# Patient Record
Sex: FEMALE | Race: WHITE
[De-identification: names, ages, dates, MRNs, and addresses within clinical notes are randomized per-mention and may not be internally consistent; named-entity substitution may affect disease eponyms.]

---

## 2017-03-11 ENCOUNTER — HOSPITAL ENCOUNTER (INPATIENT)
Dept: HOSPITAL 68 - ERH | Age: 27
LOS: 5 days | Discharge: HOME HEALTH | End: 2017-03-16
Attending: INTERNAL MEDICINE | Admitting: INTERNAL MEDICINE
Payer: COMMERCIAL

## 2017-03-11 VITALS — BODY MASS INDEX: 23.9 KG/M2 | WEIGHT: 140 LBS | HEIGHT: 64 IN

## 2017-03-11 DIAGNOSIS — F39: ICD-10-CM

## 2017-03-11 DIAGNOSIS — F17.200: ICD-10-CM
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
## 2017-03-11 DIAGNOSIS — F10.129: Primary | ICD-10-CM

## 2017-03-11 DIAGNOSIS — F32.9: ICD-10-CM

## 2017-03-11 DIAGNOSIS — R74.0: ICD-10-CM

## 2017-03-11 DIAGNOSIS — K21.9: ICD-10-CM

## 2017-03-11 LAB
ABSOLUTE GRANULOCYTE CT: 3.6 /CUMM (ref 1.4–6.5)
BASOPHILS # BLD: 0.1 /CUMM (ref 0–0.2)
BASOPHILS NFR BLD: 1 % (ref 0–2)
EOSINOPHIL # BLD: 0.1 /CUMM (ref 0–0.7)
EOSINOPHIL NFR BLD: 1.3 % (ref 0–5)
ERYTHROCYTE [DISTWIDTH] IN BLOOD BY AUTOMATED COUNT: 15.2 % (ref 11.5–14.5)
GRANULOCYTES NFR BLD: 50.5 % (ref 42.2–75.2)
HCT VFR BLD CALC: 45.4 % (ref 37–47)
LYMPHOCYTES # BLD: 2.8 /CUMM (ref 1.2–3.4)
MCH RBC QN AUTO: 30.7 PG (ref 27–31)
MCHC RBC AUTO-ENTMCNC: 33.6 G/DL (ref 33–37)
MCV RBC AUTO: 91.5 FL (ref 81–99)
MONOCYTES # BLD: 0.5 /CUMM (ref 0.1–0.6)
PLATELET # BLD: 310 /CUMM (ref 130–400)
PMV BLD AUTO: 6.3 FL (ref 7.4–10.4)
RED BLOOD CELL CT: 4.96 /CUMM (ref 4.2–5.4)
WBC # BLD AUTO: 7 /CUMM (ref 4.8–10.8)

## 2017-03-11 PROCEDURE — G0480 DRUG TEST DEF 1-7 CLASSES: HCPCS

## 2017-03-11 PROCEDURE — 2NASP: CPT

## 2017-03-12 VITALS — SYSTOLIC BLOOD PRESSURE: 135 MMHG | DIASTOLIC BLOOD PRESSURE: 86 MMHG

## 2017-03-12 VITALS — DIASTOLIC BLOOD PRESSURE: 67 MMHG | SYSTOLIC BLOOD PRESSURE: 137 MMHG

## 2017-03-12 VITALS — SYSTOLIC BLOOD PRESSURE: 108 MMHG | DIASTOLIC BLOOD PRESSURE: 63 MMHG

## 2017-03-12 VITALS — DIASTOLIC BLOOD PRESSURE: 65 MMHG | SYSTOLIC BLOOD PRESSURE: 120 MMHG

## 2017-03-12 VITALS — DIASTOLIC BLOOD PRESSURE: 60 MMHG | SYSTOLIC BLOOD PRESSURE: 114 MMHG

## 2017-03-12 VITALS — DIASTOLIC BLOOD PRESSURE: 65 MMHG | SYSTOLIC BLOOD PRESSURE: 132 MMHG

## 2017-03-12 PROCEDURE — HZ2ZZZZ DETOXIFICATION SERVICES FOR SUBSTANCE ABUSE TREATMENT: ICD-10-PCS | Performed by: INTERNAL MEDICINE

## 2017-03-12 NOTE — ED PSY CRISIS COLLATERAL NOTE
Collateral Note
 
Collateral Note
Family/Inform/Rosalind Contacts:
telephone call with pt. father Basilio Nesbitt.  He stated that family will take 
"any advice the ED would give" as his daughter has had a significant drinking 
problem for "8-9 years".  He reported that pt. had been in rehab in [place] for the 
last two years and that she was "kicked out of rehab for drinking".  he reported
she has "very sneaky behaviors" around drinking and has possibly tricked the 
parents and the rehab that she was sober when she was in fact not.  As for 
psychiatric issues, father reported that pt. "seemed depressed" and showed this 
by having no energy/motivation and being emotional and crying easily.  He stated
that she has never attempted suicide.  He reported that she can be combative 
when drinking.

## 2017-03-12 NOTE — HISTORY & PHYSICAL
DAVID PATEL,MITZY 03/12/17 2023:
General Information and HPI
MD Statement:
I have seen and personally examined DUNCAN GONSALEZ and documented this H&P.
 
The patient is a 26 year old F who presented with a patient stated chief 
complaint of requesting Alcohol detox.
 
Source of Information: patient, ED documents
History of Present Illness:
This is a 26-year-old lady with a questionable history of depression, an 
extensive social history of alcohol abuse since age of 10, including an episode 
of alcohol intoxication seizure, presents to [facility] ED for alcohol detox.
 
Patient presented yesterday around 9 PM (03/11) with complaints of tremors, some
slurred speech, confusion, nausea, and vomiting.  She reports her last drink was
about 2 hours prior to presentation.  Patient endorses a daily consumption of 2 
pints of vodka for about one year.  When seen at the ED yesterday, patient was 
reported to be anxious and exhibited tremors. The ED records indicated that the 
main reason patient presented to [facility] ED was because of her parents were fed 
up, and were seeking help with detox.  Patient reports that in 2014 she was 
admitted to a rehabilitation facility for detox in [place] and remained 9 months
abstinent before relapsing.  However, psych crisis documentation states that 
patient was noncompliant during rehabilitation and continued to drink.
 
Patient denies any SI/HI, use of any other illicit drug, fever, chills, chest 
pain, palpitation, shortness of breath, focal neurological deficit, seizure-like
activities, hallucination or nightmares.
 
Allergies/Medications
Allergies:
Coded Allergies:
NO KNOWN ALLERGIES (05/21/14)
 
Home Med list
No Known Home Medications
 
 
Past History
 
Travel History
Traveled to [place] past 21 day No
 
Medical History
Neurological: NONE
EENT: NONE
Cardiovascular: NONE
Respiratory: NONE
Gastrointestinal: NONE
Hepatic: NONE
Renal: NONE
Musculoskeletal: NONE
Psychiatric: NONE
Endocrine: NONE
 
Surgical History
Surgical History: none
 
Past Family/Social History
 
Psychosocial History
ETOH Use: alcoholic
Illicit Drug Use: UTD
 
Review of Systems
 
Review of Systems
Constitutional:
Reports: see HPI. 
 
Exam & Diagnostic Data
Last 24 Hrs of Vital Signs/I&O
 Vital Signs
 
 
Date Time Temp Pulse Resp B/P Pulse O2 O2 Flow FiO2
 
     Ox Delivery Rate 
 
03/12 2246 95.8 73 16 108/63    
 
03/12 2246 95.8 73 16 108/63 98 Room Air  
 
03/12 1948 96.5 91 16 135/86    
 
03/12 1948 96.5 91 16 135/86 98 Room Air  
 
03/12 1647 98.6 79 16 114/60    
 
03/12 1647 98.6 79 16 114/60 99 Room Air  
 
03/12 1407 96.8 90 18 132/65    
 
03/12 1356 96.8 90 18 132/65 98 Room Air  
 
03/12 1215 96.6 92 16 137/67    
 
03/12 1214 96.6 92 16 137/67 98 Room Air  
 
03/12 1047 98.1 92 20 124/70 97 Room Air  
 
03/12 0827 98.0 96 18 120/65    
 
03/12 0804 98.0 96 18 120/65 96 Room Air  
 
03/12 0642 98.2 93 18 121/62 97 Room Air  
 
03/12 0432 96.1 93 18 123/63 97 Room Air  
 
 
 Intake & Output
 
 
 03/12 1600 03/12 0800 03/12 0000
 
Intake Total   
 
Output Total   
 
Balance   
 
    
 
Patient   65.771 kg
 
Weight   
 
 
 
 
Physical Exam
General Appearance Alert, Oriented X3, Cooperative
Skin No Significant Lesion
HEENT oral dry mucosa
Neck Supple, No JVD, No thryomegaly, +2 Carotid Pulse wo Bruit
Lymphatic Cervical nl
Cardiovascular Regular Rate, Normal S1, Normal S2, No Murmurs, Gallops, Rubs
Lungs Clear to Auscultation, Normal Air Movement
Abdomen Normal Bowel Sounds, Soft, mild tenderness on left abdominal side. no 
guarding or rebound tenderness
Neurological Normal Speech, Sensation Intact
Extremities No Cyanosis, No Edema, Normal Pulses, No Tenderness/Swelling
Vascular Normal Pulses, Pulses Symmetrical
 
Assessment/Plan
Assessment:
This is a 26-year-old lady with a significant social history of alcohol abuse 
since 10 years, and I reported alcohol intoxication related seizure, presents to
[facility] ED seeking alcohol detoxification.
 
Patient was noted to be slightly tachycardic on presentation  ().U tox was
remarkable for alcohol level of 406.Patient's CIWA score has been ranging 
between 2-8 with tremors being prominent. 
 
Assessment and plan
#Alcohol intoxication
* Admit patient to general medicine 
* Lorazepam 2 mg every 6 hours
* Lorazepam when necessary per CIWA protocol
* Monitor for any withdrawal seizures
* Zofran 4 mg every 4-6 hours for nausea and vomiting
* Normal saline hydration 150 mL per hour
* Banana bag
* Will obtain psychosocial consult tomorrow morning
 
#Tobacco abuse
* Nicotine 14 mg done
* Smoking cessation counseling done
 
#History of depression
Patient endorses depression with a possible remote diagnosis however no 
treatment.
Will obtain psych consult for evaluation and management of depressed.
 
 
As Ranked By This Provider
Problem List:
 1. Alcohol intoxication
 
 2. EtOH dependence
   Qualifiers
 Substance use status: in withdrawal Complication of substance-induced condition
: uncomplicated Qualified Code: F10.230 - Alcohol dependence with withdrawal, 
uncomplicated
 
 
Core Measures/Miscellaneous
 
Acute Coronary Syndrome
ACS Diagnosis: No
 
Cerebrovascular Accident
CVA/TIA Diagnosis: No
 
Congestive Heart Failure
CHF Diagnosis: No
 
Venous Thromboembolism
VTE Risk Factors: Age > 40
[facility] prophylaxis d/t: No contraindications
No VTE Pharm Prophylaxis d/t: No contraindications
VTE Diagnosis: No
VTE Type: NONE
VTE Confirmed by (Test): NONE
 
Severe Sepsis
Severe Sepsis Present: No
 
Septic Shock
Septic Shock Present: No
 
Miscellaneous Documentation
Attending Case Discussed With:
KRISTA PATTON MD
 
Primary Care Physician:
PATIENT HAS NO PRIMARY CARE DR
 
Patient sees these Specialists
none
Level of Patient Care: General Medicine
 
 
ORTIZ HUMPHREYS 03/12/17 2142:
Resident Review Statement
Resident Statement: examined this patient, discussed with intern, agreed with 
intern
Other Findings:
Patient is a 26-year-old woman with no significant past medical history except 
for for alcohol abuse with alcohol related withdrawal seizures, current every 
day smoker, history of depression and anxiety(never been treated for that) 
percent to the ED for evaluation of alcohol abuse in requesting detox.
Patient has been drinking since the age of 10, drinks 2 pints of vodka on a 
daily basis.  She was in the rehabilitation in [place] in 2014, but was 
discharged from the program as she started drinking again. She reported one 
episode of alcohol related seizure episode.
Last drink was around 7 PM before coming to the hospital.  In the ED patient was
confused, had bilateral tremors remained nauseous and vomited couple of times.
She was treated with IV Ativan and Zofran as needed for nausea in the ED.  She 
was coarse in the last 24 hours 7, 6, 9, 11 mostly scored high on the tremors.
 
Patient denied any shortness of breath, reported some intermittent chest 
discomfort without any palpitations.  Still nauseous with some left upper 
quadrant discomfort night any diarrhea or constipation.  Denied any recent 
infections.
She continues to smoke and denies any illicit drug use.
 
Vitals on admission temperature 98.8, pulse 100, respiratory rate 20, blood 
pressure 139/44 on room air
 
On examination
 Appearance: Alert and oriented 3 ,not in acute distress
Skin: Grossly normal.
HEENT: PEERLA
Neck: Supple, No JVD
Cardiovascular: Regular Rate, Normal S1, Normal S2, No Murmurs
Lungs: Lungs clear to auscultation bilaterally
Abdomen: Slight tenderness in the left upper quadrant on exam without any 
guarding.
Neurological: Neuro exam intact grossly
Extremities: No Clubbing, No Cyanosis, No Edema.  Bilateral hand tremors
Vascular: Normal Pulses.
 
Pertinent labs on admission normal WBC count H&H stable and normal platelet 
count, normal BEP, transaminitis: , , with elevated anion gap 21
Pending lipase, Mg and phosphorous
Urine toxicology positive for alcohol level around 406.
 
Assessment and plan:
 
1.  Alcohol intoxication/abuse with withdrawal,  requesting detox:
* We will admit the patient GenMed floor
* Patient appearsdehydrated, we'll start the patient on normal saline at the 
rate of 150 mL per hour.
* Continue with banana bag overnight start the patient on by mouth thiamine 
folate and multivitamins in the morning.
* Start the patient on scheduled Ativan 2 mg every 6 hours
* Continue Ativan as per [facility] protocol.
* Zofran as needed for nausea.
* Fall and seizure precautions
* Social consult in the morning
* Psych consult in the morning.
* Watch for any withdrawal seizures.
 
2.Transaminitis:likely to alcohol abuse :
* Repeat LFTs in am .
 
3. Intermitent heart burn (Alcohol related  GERD /gasstritis :
* Mg normal 
* Start PPI in am 
 
4.  History of  questional depression:
* Will obtain psych consult in the morning for further evaluation of anxiety 
depression.
* Patient would benefit from treatment of depression.
 
5.  Current every day smoker
* Smoking cessation counseling done
* We will start the patient on nicotine patches.
 
Mild to moderate pain controlled with oxycodone and small dose of morphine.
DVT prophylaxis with Lovenox
Patient is full code
 
 
 
KRISTA PATTON 03/13/17 0205:
Attending MD Review Statement
 
Attending Statement
Attending MD Statement: examined this patient, discuss w/resident/PA/NP, agreed 
w/resident/PA/NP, reviewed EMR data (avail), reviewed images, amended to note
Attending Assessment/Plan:
 
CC: alcohol Detox 
PMHx: alcoholism, alcohol related Sz
 
Patient came to ER for EtOH detox. She has been drinking 2 pints of alcohol for 
the last year, last drink was yesterday. She moved from [place] 2 months back 
and has been living with parents. Her parents suggested the detox and brought 
her to ER. In the past patient underwent 42 days of inpatient rehab and 9 month 
of outpatient rehab till Aug 2015, and relapsed again. She complains of 
intermittent nausea, vomiting, and epigasrtic abdominal pain, and chest pain, 
feeling better now. Denies illicit drug use. She denies SI or plan, homicidal 
ideation, visual or auditory hallucinations, LOC, falls, trauma, Sz, fevers, 
chills, SOB, or focal neurologic s/s 
 
Vitals: afebrile, Hr in 70s to 90s blood pressure in acceptable range. 
Saturating well on room air. On examination a O 3, no anxiety, no tremors, 
mucosa dry, neck supple, no lymphadenopathy, no JVD, skin rash on neck (
nonpruritic, absent on abdomen and back) CVS: S1-S2, RRR. RS: Clear to 
auscultate bilaterally. Abdomen: Soft, mild epigastric tenderness, no guarding 
or rigidity, ND, bowel sounds present. No focal neurological deficit. 
 
Labs: Done on 03/11/2017 shows anion gap of 21, bicarbonate 26, creatinine 0.6, 
BUN 10, , , total protein 8.5, beta-hCG negative, alcohol level 
406, U tox negative
 
A and P 
 
#1 alcohol withdrawal 
Heavy alcohol use since the age of 14, failed rehabilitation in the past, wants 
to go for inpatient rehabilitation again and request detox. CIWA score in ER was
ranging from 11-6
- repeat CMP today, check magnesium, phosphorus, replace accordingly
- Scheduled Ativan 2 mg by mouth every 6 hourly, taper from tomorrow, as needed 
IV Ativan according to CIWA score.
- By mouth thiamine 100 mg daily, by mouth folic acid, Alta 10 patch
-Psychiatry consult 
 
#2 transaminitis: Secondary to alcoholism, repeated today and as required 
tomorrow, depending on today's labs. Patient has multiple tattoos done in 
different locations, check hepatitis B and C panel 
 
#3 epigastric pain: Probably secondary to alcohol related gastritis: Check 
lipase, PPI by mouth 
 
#4 encourage oral diet, IV hydration with NS and banana bag for her on an gap 
metabolic acidosis and metabolic alkalosis related to volume contraction, when 
necessary Zofran for nausea and vomiting

## 2017-03-13 VITALS — SYSTOLIC BLOOD PRESSURE: 130 MMHG | DIASTOLIC BLOOD PRESSURE: 80 MMHG

## 2017-03-13 VITALS — DIASTOLIC BLOOD PRESSURE: 80 MMHG | SYSTOLIC BLOOD PRESSURE: 130 MMHG

## 2017-03-13 VITALS — SYSTOLIC BLOOD PRESSURE: 110 MMHG | DIASTOLIC BLOOD PRESSURE: 70 MMHG

## 2017-03-13 VITALS — SYSTOLIC BLOOD PRESSURE: 122 MMHG | DIASTOLIC BLOOD PRESSURE: 70 MMHG

## 2017-03-13 LAB
ABSOLUTE GRANULOCYTE CT: 2.3 /CUMM (ref 1.4–6.5)
BASOPHILS # BLD: 0 /CUMM (ref 0–0.2)
BASOPHILS NFR BLD: 1.1 % (ref 0–2)
EOSINOPHIL # BLD: 0.2 /CUMM (ref 0–0.7)
EOSINOPHIL NFR BLD: 3.5 % (ref 0–5)
ERYTHROCYTE [DISTWIDTH] IN BLOOD BY AUTOMATED COUNT: 14.6 % (ref 11.5–14.5)
GRANULOCYTES NFR BLD: 50.7 % (ref 42.2–75.2)
HCT VFR BLD CALC: 42.2 % (ref 37–47)
LYMPHOCYTES # BLD: 1.5 /CUMM (ref 1.2–3.4)
MCH RBC QN AUTO: 30.9 PG (ref 27–31)
MCHC RBC AUTO-ENTMCNC: 33.6 G/DL (ref 33–37)
MCV RBC AUTO: 92 FL (ref 81–99)
MONOCYTES # BLD: 0.5 /CUMM (ref 0.1–0.6)
PLATELET # BLD: 202 /CUMM (ref 130–400)
PMV BLD AUTO: 6.9 FL (ref 7.4–10.4)
RED BLOOD CELL CT: 4.59 /CUMM (ref 4.2–5.4)
WBC # BLD AUTO: 4.6 /CUMM (ref 4.8–10.8)

## 2017-03-13 NOTE — PN- HOUSESTAFF
REY PATEL,[facility] 17 0732:
Subjective
Follow-up For:
Alcohol Detoxification
Subjective:
Miss Rankin was seen and examined this morning.  She is resting comfortably in 
bed.  Patient states that she feels very tired.  She states that she was unable 
to sleep till late last night.  Patient denies any fever, chills, nausea, 
vomiting.  She denies any auditory and/or visual hallucinations she denies any 
anxiety or tremors. She is tolerating a PO diet well. 
 
Review of Systems
Constitutional:
Reports: see HPI. 
 
Objective
Last 24 Hrs of Vital Signs/I&O
 Vital Signs
 
 
Date Time Temp Pulse Resp B/P Pulse O2 O2 Flow FiO2
 
     Ox Delivery Rate 
 
 0704 98.0 88 16 130/80 98 Room Air  
 
 0010 98.0 88 16 130/80 98 Room Air  
 
 2246 95.8 73 16 108/63    
 
 2246 95.8 73 16 108/63 98 Room Air  
 
 1948 96.5 91 16 135/86    
 
 1948 96.5 91 16 135/86 98 Room Air  
 
 1647 98.6 79 16 114/60    
 
 1647 98.6 79 16 114/60 99 Room Air  
 
 1407 96.8 90 18 132/65    
 
 1356 96.8 90 18 132/65 98 Room Air  
 
 
 Intake & Output
 
 
  1600  0800  0000
 
Intake Total  1500 240
 
Output Total   
 
Balance  1500 240
 
    
 
Intake, IV  1200 
 
Intake, Oral  300 240
 
Patient   63.503 kg
 
Weight   
 
 
 
 
Physical Exam
General Appearance: Alert, Oriented X3, Cooperative
Cardiovascular: Regular Rate, Normal S1, Normal S2
Lungs: Clear to Auscultation
Abdomen: Normal Bowel Sounds, Soft, No Tenderness
Neurological: Normal Speech
Extremities: No Cyanosis, No Edema, Normal Pulses
Vascular: Normal Pulses
Current Medications:
 Current Medications
 
 
  Sig/Rishabh Start time  Last
 
Medication Dose Route Stop Time Status Admin
 
Acetaminophen 650 MG Q6P PRN  2215 AC 
 
  PO   
 
Cyanocobalamin/ 1 BAG ONCE ONE  2230 DC 
 
Thiamine/Pyridoxine  IV  0629  
 
Dextrose/Water 1,000 ML    
 
Enoxaparin Sodium 40 MG DAILY  1000 AC 
 
  SC   1126
 
Folic Acid 1 MG DAILY  1000 AC 
 
  PO   1125
 
Influenza Virus  0.5 ML 1000  1000 DC 
 
Vaccine  IM  1001  
 
Lorazepam 1.5 MG Q6  1200 AC 
 
  PO   1126
 
Lorazepam 0 .STK-MED ONE 2202 DC 
 
  PO   
 
Lorazepam 2 MG Q6  2145 DC 
 
  PO   0525
 
Lorazepam 1 MG Q1P PRN  214 AC 
 
  IV   
 
Lorazepam 0 .STK-MED ONE  1448 DC 
 
  PO   
 
Lorazepam 2 MG Q2P PRN  2315 AC 
 
  PO   
 
Lorazepam 1 MG Q2P PRN  2315 AC 
 
  PO   1449
 
Morphine Sulfate 1 MG Q8P PRN  2215 DC 
 
  IV   
 
Multivitamins 1 TAB DAILY  1000 AC 
 
  PO   1125
 
Nicotine 0 .STK-MED ONE 2202 DC 
 
  TOP   
 
Nicotine 14 MG DAILY  214 AC 
 
  TOP   2237
 
Omeprazole 20 MG DAILY AC  0700 CAN 
 
  PO   
 
Omeprazole 20 MG DAILY AC  0700 AC 
 
  PO   0528
 
Ondansetron HCl 4 MG .STK-MED ONE  0013 DC 
 
  PO  0014  
 
Ondansetron HCl 4 MG Q8P PRN  2145 AC 
 
  PO   0018
 
Oxycodone HCl 5 MG Q6P PRN  221 AC 
 
  PO   
 
Sodium Chloride 1,000 ML Q6H  2145 AC 
 
  IV   0659
 
Thiamine HCl 100 MG DAILY  1000 AC 
 
  PO   1125
 
Thiamine HCl 0 .STK-MED ONE  215 DC 
 
  .ROUTE   
 
 
 
 
Last 24 Hrs of Lab/Deven Results
Last 24 Hrs of Labs/Mics:
 Laboratory Tests
 
17 0645:
Anion Gap 12, Estimated GFR > 60, BUN/Creatinine Ratio 18.3, CBC w Diff NO MAN 
DIFF REQ, RBC 4.59, MCV 92.0, MCH 30.9, RDW 14.6  H, MPV 6.9  L, Gran % 50.7, 
Lymphocytes % 33.5, Monocytes % 11.2  H, Eosinophils % 3.5, Basophils % 1.1, 
Absolute Granulocytes 2.3, Absolute Lymphocytes 1.5, Absolute Monocytes 0.5, 
Absolute Eosinophils 0.2, Absolute Basophils 0, PUBS MCHC 33.6, Hepatitis C 
Antibody NONREACTIVE
 
17 0030:
Anion Gap 16, Estimated GFR > 60, BUN/Creatinine Ratio 20.0
 
 
Assessment/Plan
Assessment:
Ms Rankin is a 26 year old female with alcohol abuse history who has been 
admitted for acute alcoholic withdrawal. She has has an extensive alcohol use 
history for over 15 years, and has recently been consuming at least 2 pints of 
vodka per day. 
 
#Alcohol Abuse/intoxication 
Patient was initially started on maintainance fluids. 
Continue vitamins: thiamine, folate, MVI
The patient was started on scheduled Ativan 2 mg every 6 hours-->1.5 mg Q6. If 
stable, can consider reducing in the next 12 hours. Do not decrease ativan by 
more than 20% over a 24 hour period. 
Continue Ativan as per CIWA protocol: current ciwa: 8,1,0,0,2,3,5
Zofran as needed for nausea.
Fall and seizure precautions
Tylenol for symptoms of headache. 
 
#Transaminitis
LFTs at the time of admission revealed:
Repeat LFTs in am .
 
#Mood Disturbance History
Formal psychiartric consultation obtained. 
The patient might benefit from IOP once stable from a medical perspective. 
Gabapentin had been suggested to aid in the symptoms of anxiety. 
The patient did appear drowsy, but if warranted, consider Melatonin PRN. 
 
#History of Smoking
Currently smokes approximately 10 cigarettes per day. 
Nicotine Patch 14 mg. 
 
#DVT Prophylaxis:
with Lovenox
 
#Code:
Full code
Problem List:
 1. EtOH dependence
 
 2. Depression
 
 3. Alcohol intoxication
 
 4. Alcohol abuse
 
Pain Ratin
Pain Location:
No Pain reported
Pain Goal: Remain pain free
Pain Plan:
Tylenol PRN
Tomorrow's Labs & Rationales:
BEP: monitor electrolytes in the setting of acute alcohol withdrawal and 
detoxification. 
 
 
BERTHA PATEL,ARELY 17 1217:
Attending MD Review Statement
 
Attending Statement
Attending MD Statement: examined this patient, discuss w/resident/PA/NP, agreed 
w/resident/PA/NP, reviewed EMR data (avail), discussed with nursing, discussed 
with case mgmt, reviewed images, amended to note
Attending Assessment/Plan:
Patient seen and examined, feeling overall better today.  She is a 26-year-old 
who is admitted with acute alcohol intoxication needing detox.
 
Vital Signs
 
 
Date Time Temp Pulse Resp B/P Pulse O2 O2 Flow FiO2
 
     Ox Delivery Rate 
 
 0704 98.0 88 16 130/80 98 Room Air  
 
 0010 98.0 88 16 130/80 98 Room Air  
 
 2246 95.8 73 16 108/63    
 
/ 2246 95.8 73 16 108/63 98 Room Air  
 
 1948 96.5 91 16 135/86    
 
/ 1948 96.5 91 16 135/86 98 Room Air  
 
 1647 98.6 79 16 114/60    
 
/ 1647 98.6 79 16 114/60 99 Room Air  
 
 1407 96.8 90 18 132/65    
 
/ 1356 96.8 90 18 132/65 98 Room Air  
 
 
on exam; 
aox3, nad. 
cv; s1,s2, rrr
resp; clear
abd; soft, nt, bs+
ext; no edema 
 
 Laboratory Tests
 
 
 
 
 0645 0030
 
Chemistry  
 
  Sodium (137 - 145 mmol/L) 133  L 136  L
 
  Potassium (3.5 - 5.1 mmol/L) 3.7 3.7
 
  Chloride (98 - 107 mmol/L) 94  L 90  L
 
  Carbon Dioxide (22 - 30 mmol/L) 27 30
 
  Anion Gap (5 - 16) 12 16
 
  BUN (7 - 17 mg/dL) 11 12
 
  Creatinine (0.5 - 1.0 mg/dL) 0.6 0.6
 
  Estimated GFR (>60 ml/min) > 60 > 60
 
  BUN/Creatinine Ratio (7 - 25 %) 18.3 20.0
 
Hematology  
 
  CBC w Diff NO MAN DIFF REQ 
 
  WBC (4.8 - 10.8 /CUMM) 4.6  L 
 
  RBC (4.20 - 5.40 /CUMM) 4.59 
 
  Hgb (12.0 - 16.0 G/DL) 14.2 
 
  Hct (37 - 47 %) 42.2 
 
  MCV (81.0 - 99.0 FL) 92.0 
 
  MCH (27.0 - 31.0 PG) 30.9 
 
  RDW (11.5 - 14.5 %) 14.6  H 
 
  Plt Count (130 - 400 /CUMM) 202 
 
  MPV (7.4 - 10.4 FL) 6.9  L 
 
  Gran % (42.2 - 75.2 %) 50.7 
 
  Lymphocytes % (20.5 - 51.1 %) 33.5 
 
  Monocytes % (1.7 - 9.3 %) 11.2  H 
 
  Eosinophils % (0 - 5 %) 3.5 
 
  Basophils % (0.0 - 2.0 %) 1.1 
 
  Absolute Granulocytes (1.4 - 6.5 /CUMM) 2.3 
 
  Absolute Lymphocytes (1.2 - 3.4 /CUMM) 1.5 
 
  Absolute Monocytes (0.10 - 0.60 /CUMM) 0.5 
 
  Absolute Eosinophils (0.0 - 0.7 /CUMM) 0.2 
 
  Absolute Basophils (0.0 - 0.2 /CUMM) 0 
 
  PUBS MCHC (33.0 - 37.0 G/DL) 33.6 
 
Serology  
 
  Hepatitis C Antibody (NONREACTIVE) NONREACTIVE 
 
 
A/P; 26-year-old female with past medical history significant for alcohol abuse,
depression who is admitted with acute alcohol intoxication.
 
Continue scheduled Ativan with a taper.  Will decrease it to 1.5 g every 6 
hours.  Continue when necessary Ativan per CIWA protocol.  Continue multivitamin
, folate and thiamine.
Psych consult is pending.
DVT px: Lovenox.

## 2017-03-13 NOTE — TRANSFER OF CARE SUMMARY
Hospital Course
 
Course
Hospital Course:
Ms Rankin is a 26 year old female with alcohol abuse history who has been 
admitted for acute alcoholic withdrawal. She has has an extensive alcohol use 
history for over 15 years, and has recently been consuming at least 2 pints of 
vodka per day. 
 
#Alcohol Abuse/intoxication 
Patient was initially started on maintainance fluids. 
Continue vitamins: thiamine, folate, MVI
The patient was started on scheduled Ativan 2 mg every 6 hours-->1.5 mg Q6. If 
stable, can consider reducing in the next 12 hours. Do not decrease ativan by 
more than 20% over a 24 hour period. 
Continue Ativan as per CIWA protocol: current ciwa: 8,1,0,0,2,3,5
Zofran as needed for nausea.
Fall and seizure precautions
Tylenol for symptoms of headache. 
 
#Transaminitis
LFTs at the time of admission revealed:
Repeat LFTs in am .
 
#Mood Disturbance History
Formal psychiartric consultation obtained. 
The patient might benefit from IOP once stable from a medical perspective. 
Gabapentin had been suggested to aid in the symptoms of anxiety. 
The patient did appear drowsy, but if warranted, consider Melatonin PRN. 
 
#History of Smoking
Currently smokes approximately 10 cigarettes per day. 
Nicotine Patch 14 mg. 
 
#DVT Prophylaxis:
with Lovenox
 
#Code:
Full code
Pertinent Lab Results:
Serum Alcohol Level 406
AST: 164
ALT: 137
Assessment/Plan:
Ms Rankin is a 26 year old female with alcohol abuse history who has been 
admitted for acute alcoholic withdrawal. She has has an extensive alcohol use 
history for over 15 years, and has recently been consuming at least 2 pints of 
vodka per day. 
Continue the patient on CIWA, she will likely need IOP once she is medically 
cleared.

## 2017-03-13 NOTE — CONS- PSYCHIATRY
**See Addendum**
TEENA MCDONALD 03/13/17 1405:
Psychiatric Consult
Date of Consult: 03/13/17
Reason for Consult:
depression, EtOH detox
History of Present Illness:
26F with history of depression, alcohol abuse disorder is on HD#3 for EtOH 
detoxification. Pt has used alcohol since age 10, one unsuccessful attempt at 
rehab in 2014, has most recently been drinking "at least 2 pints of vodka per 
day". Last EtOH was at 19:00 on 3/11/17. Pt is tearful when asked if she is 
experiencing any sadness. She states her depression is at baseline (8/10) and 
her anxiety is at baseline (4/10). Physically she complains of some paroxysmal 
sweats, headache, and some heartburn today. She had nausea/vomiting yesterday 
which has resolved today with Zofran. Headache has slightly improved with 
Tylenol. She denies tremor, auditory disturbances, visual disturbances, tactile 
disturbances, agitation/irritability or confusion. CIWA score peaked at 11 
yesterday, and today has been ranging from 0-5. Pt is walking to and from the 
bathroom with ease. She states her withdrawal symptoms are being sufficiently 
controlled with medication.
 
ROS:
General: (+) Sweats.
GI: (+) Heartburn. (-) Nausea or vomiting (resolved since yesterday). (-) 
Abdominal discomfort.
Neuropsych: (+) Headache, anxiety, depression.
(-) Tremor, AH/VH/TH, agitation, irritability, confusion, SI/HI.
 
Social Hx:
- EtOH "at least 2 pints vodka per day." In November 2014 she attended a EtOH 
detox program at [facility] in [place] for 42 days, and 
reports being abstinent from EtOH for the subsequent 9 months while living in a 
[facility] house in [place] until she got bored and began drinking again.
- Denies ilicit drug use.
- Current 0.5ppd smoker.
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
being beligerant."
Allergies:
Coded Allergies:
NO KNOWN ALLERGIES (05/21/14)
 
 
Past History
 
Past Medical History
Neurological: NONE
EENT: NONE
Cardiovascular: NONE
Respiratory: NONE
Gastrointestinal: NONE
Hepatic: NONE
Renal: NONE
Musculoskeletal: NONE
Psychiatric: NONE
Endocrine: NONE
Blood Disorders: NONE
Cancer(s): NONE
 
Past Surgical History
Surgical History: 1
 
Psychosocial History
Strengths/Capabilities:
Able to ask for help, and articulate has some good insight.
Physical Limitations (Interventions):
denies
 
Psychiatric Treatment History
Diagnosis:
depression and alcohol dependence
Risk Factors: SA/MH hospitalized, substance abuse
 
LUIS F CASTILLO 03/13/17 1442:
Psychiatric Consult
Date of Consult: 03/13/17
 
Past History
 
Past Medical History
Psychiatric: alcohol dependence, depression, R/O bipolar R/O bulemia
 
Psychosocial History
Strengths/Capabilities:
Motivated for aftercare
Physical Limitations (Interventions):
None apparent
 
Psychiatric Treatment History
Psych Treatment
   Psychiatric Treatment Yes
   Inpatient Treatment Yes
   Outpatient Treatment Yes
   Location of Treatment [place], [place] rehab
   Reason for Treatment
Depression, alcohol dependence, r/o bipolar, r/o bulemia
   Dates of Treatment 2014
   Response to Treatment
Improved, but relapsed in her [facility] in Nov, 2014
Risk Factors: SA/MH hospitalized, substance abuse
 
Substance Use/Abuse History
Drug Use/Abuse
   Substances Used/Abused Yes
   Substance Used/Abused Alcohol
   First Use 10
   Last Used 3/11/17
   How much used/taken 2 pints daily
 
Substance Abuse Treatment
Substance Abuse Treatment
   Past Substance Abuse [place] Yes
   Inpatient Treatment Yes
   Outpatient Treatment Yes
   Location of Treatment [facility], rehab in [place]
   Reason for Treatment
Alcohol dependence
   Dates of Treatment 2014
   Response to Treatment
Improved until relapse in [facility] in [place]
 
Assessment/Plan
 
Mental Status
Mental Status Exam:
See above.
Lab Results:
 Laboratory Tests
 
 
 03/13 03/13
 
 0645 0030
 
Chemistry  
 
  Sodium (137 - 145 mmol/L) 133  L 136  L
 
  Potassium (3.5 - 5.1 mmol/L) 3.7 3.7
 
  Chloride (98 - 107 mmol/L) 94  L 90  L
 
  Carbon Dioxide (22 - 30 mmol/L) 27 30
 
  Anion Gap (5 - 16) 12 16
 
  BUN (7 - 17 mg/dL) 11 12
 
  Creatinine (0.5 - 1.0 mg/dL) 0.6 0.6
 
  Estimated GFR (>60 ml/min) > 60 > 60
 
  BUN/Creatinine Ratio (7 - 25 %) 18.3 20.0
 
Hematology  
 
  CBC w Diff NO MAN DIFF REQ 
 
  WBC (4.8 - 10.8 /CUMM) 4.6  L 
 
  RBC (4.20 - 5.40 /CUMM) 4.59 
 
  Hgb (12.0 - 16.0 G/DL) 14.2 
 
  Hct (37 - 47 %) 42.2 
 
  MCV (81.0 - 99.0 FL) 92.0 
 
  MCH (27.0 - 31.0 PG) 30.9 
 
  RDW (11.5 - 14.5 %) 14.6  H 
 
  Plt Count (130 - 400 /CUMM) 202 
 
  MPV (7.4 - 10.4 FL) 6.9  L 
 
  Gran % (42.2 - 75.2 %) 50.7 
 
  Lymphocytes % (20.5 - 51.1 %) 33.5 
 
  Monocytes % (1.7 - 9.3 %) 11.2  H 
 
  Eosinophils % (0 - 5 %) 3.5 
 
  Basophils % (0.0 - 2.0 %) 1.1 
 
  Absolute Granulocytes (1.4 - 6.5 /CUMM) 2.3 
 
  Absolute Lymphocytes (1.2 - 3.4 /CUMM) 1.5 
 
  Absolute Monocytes (0.10 - 0.60 /CUMM) 0.5 
 
  Absolute Eosinophils (0.0 - 0.7 /CUMM) 0.2 
 
  Absolute Basophils (0.0 - 0.2 /CUMM) 0 
 
  PUBS MCHC (33.0 - 37.0 G/DL) 33.6 
 
Serology  
 
  Hepatitis C Antibody (NONREACTIVE) NONREACTIVE 
 
 
 
3/11/17: HBSAb positive, HCG negative, /, serum ETOH 406, utox 
negative
Diffential Diagnosis:
Alcohol use d/o, severe, recurrent
Depressive d/o NOS
R/O bipolar d/o
R/o bulemia
Impression:
The patient has been treated in the past at [facility] and [facility] with Prozac, 
Abilify, gabapentin and trazodone in the past.
 
Prozac and Abilify can wait until the patient comes to [facility], as she has not been 
followed by outpatient psychiatry since leaving the [facility] in [place] in 
November/December, 2014. 
 
[facility] 3/13/17 1139: 2-3-5-7-7-5-6-9-3-6-9-11-2
VS 0704: 130/80, 88, 98.0 Oral, 16RR 98% RA
 
As of 1340, Lorazepam 5.5 mg scheduled and 1 mg PRN, for a total of 6.5 mg 
total. Reduce this total by no more than 20% daily (1.5 mg), due to the history 
of seizure.
Provisional Treatment Plan:
1. Continue ETOH Detox protocol.
 
2. Daily folate, thiamine, MVI.
 
3. The patient has agreed to come to [facility] after her alcohol detox is complete.
We will arrange that intake appointment when her discharge date is known.
 
4. Consider starting gabapentin 100 mg PO 3X/day for anxiety, and off-label use.
 
5. If insomnia, consider melatonin 3 mg PO at bedtime.
 
We will continue to follow along with you.
DILAN Luis, Pager [phone]

## 2017-03-13 NOTE — PATIENT DISCHARGE INSTRUCTIONS
Discharge Instructions
 
General Discharge Information
Watch for these problems:
Fever, nausea, vomiting, chills, weakness, increased generalized edema. 
Palpitations. Chest pain. Shortness of breath. 
 
If you have any adverse reactions from any of the medications prescribed please 
inform your primary care physician and you may be required to come back to the 
emergency department.
 
Thank you for allowing us to be part of your care. 
Special Instructions:
Please follow up with your PCP in one week. Please inform your PCP about the 
medication changes we have made and started.
 
You have an appointment at [facility] on Friday morning at 10 AM, please follow
with them on time
 
Follow up with [facility] at [place] within 2 weeks.
 
Diet
Continue normal diet: Yes
 
Activity
Full Activity/No Limits: Yes
 
Acute Coronary Syndrome
 
Inclusion Criteria
At DC or during hospital stay patient has or had the following:
ACS DIAGNOSIS No
 
Discharge Core Measures
Meds if any: Prescribed or Continued at Discharge
Meds if any: NOT Prescribed or Continued at Discharge
 
Congestive Heart Failure
 
Inclusion Criteria
At DC or during hospital stay patient has or had the following:
CHF DIAGNOSIS No
 
Discharge Core Measures
Meds if any: Prescribed or Continued at Discharge
Meds if any: NOT Prescribed or Continued at Discharge
 
Cerebrovascular accident
 
Inclusion Criteria
At DC or during hospital stay patient has or had the following:
CVA/TIA Diagnosis No
 
Discharge Core Measures
Meds if any: Prescribed or Continued at Discharge
Meds if any: NOT Prescribed or Continued at Discharge
 
Venous thromboembolism
 
Inclusion Criteria
VTE Diagnosis No
VTE Type NONE
VTE Confirmed by (Test) NONE
 
Discharge Core Measures
- Per Current guidelines, there needs to be overlap
- treatment for the first 5 days of Warfarin therapy.
- If discharged on Warfarin prior to 5 days of
- overlap therapy, the patient will need to be
- assessed for post discharge needs including
- *Post discharge parental anticoagulation
- *Warfarin and/or parental anticoagulation education
- *Follow up date to check INR post discharge
At least 5 days overlap therapy as Inpatient No
Meds if any: Prescribed or Continued at Discharge
Note: Overlap Therapy is Warfarin and Anticoagulant
Meds if any: NOT Prescribed or Continued at Discharge

## 2017-03-14 VITALS — SYSTOLIC BLOOD PRESSURE: 106 MMHG | DIASTOLIC BLOOD PRESSURE: 80 MMHG

## 2017-03-14 VITALS — SYSTOLIC BLOOD PRESSURE: 130 MMHG | DIASTOLIC BLOOD PRESSURE: 70 MMHG

## 2017-03-14 VITALS — SYSTOLIC BLOOD PRESSURE: 122 MMHG | DIASTOLIC BLOOD PRESSURE: 84 MMHG

## 2017-03-14 VITALS — DIASTOLIC BLOOD PRESSURE: 84 MMHG | SYSTOLIC BLOOD PRESSURE: 122 MMHG

## 2017-03-14 VITALS — DIASTOLIC BLOOD PRESSURE: 60 MMHG | SYSTOLIC BLOOD PRESSURE: 110 MMHG

## 2017-03-14 VITALS — DIASTOLIC BLOOD PRESSURE: 70 MMHG | SYSTOLIC BLOOD PRESSURE: 110 MMHG

## 2017-03-14 NOTE — PN- HOUSESTAFF
[facility]
Subjective
Follow-up For:
Alcohol detox
Subjective:
Afebrile, no acute overnight events reported, patient looks relaxed and 
comfortable.  She is not anxious, agitated, and denies any tremor or 
hallucination. 
 
Review of Systems
Constitutional:
Denies: chills, fever, weakness. 
Cardiovascular:
Reports: no symptoms. 
Respiratory:
Reports: no symptoms. 
Gastrointestinal:
Reports: no symptoms. 
Genitourinary:
Reports: no symptoms. 
 
Objective
Last 24 Hrs of Vital Signs/I&O
 Vital Signs
 
 
Date Time Temp Pulse Resp B/P Pulse O2 O2 Flow FiO2
 
     Ox Delivery Rate 
 
 0801 98.7 60 20 110/60 96 Room Air  
 
 0724 98.7 80 18 106/80 96 Room Air  
 
 0211 98.9 87 20 110/70 96 Room Air  
 
 2331 99.1 89 20 110/70 97   
 
 1355 98.1 82 18 122/70 97 Room Air  
 
 
 Intake & Output
 
 
  1600  0800  0000
 
Intake Total  1300 700
 
Output Total   
 
Balance  1300 700
 
    
 
Intake, IV  1200 600
 
Intake, Oral  100 100
 
 
 
 
Physical Exam
General Appearance: Alert, Oriented X3, Cooperative, No Acute Distress
Skin: No Rashes
HEENT: Atraumatic, PERRLA, EOMI, Mucous Membr. moist/pink
Cardiovascular: Regular Rate, Normal S1, Normal S2, No Murmurs
Lungs: Clear to Auscultation, Normal Air Movement
Abdomen: Soft, No Tenderness
Neurological: Normal Speech
Extremities: No Clubbing, No Cyanosis, No Edema
Current Medications:
 Current Medications
 
 
  Sig/Rishabh Start time  Last
 
Medication Dose Route Stop Time Status Admin
 
Acetaminophen 650 MG Q6P PRN  2215 AC 
 
  PO   
 
Enoxaparin Sodium 40 MG DAILY  1000 AC 
 
  SC   1126
 
Folic Acid 1 MG DAILY  1000 AC 
 
  PO   1125
 
Gabapentin 100 MG Q8P PRN  1915 AC 
 
  PO   
 
Influenza Virus  0.5 ML 1000  1000 DC 
 
Vaccine  IM  1001  
 
Lorazepam 1.5 MG Q6  1200 AC 
 
  PO   0559
 
Lorazepam 2 MG Q6  2145 DC 
 
  PO   0525
 
Lorazepam 1 MG Q1P PRN  2145 AC 
 
  IV   
 
Lorazepam 2 MG Q2P PRN  2315 AC 
 
  PO   
 
Lorazepam 1 MG Q2P PRN  2315 AC 
 
  PO   1449
 
Morphine Sulfate 1 MG Q8P PRN  2215 DC 
 
  IV   
 
Multivitamins 1 TAB DAILY  1000 AC 
 
  PO   1125
 
Nicotine 14 MG DAILY  2145 AC 
 
  TOP   2237
 
Omeprazole 20 MG DAILY AC  0700 AC 
 
  PO   0600
 
Ondansetron HCl 4 MG Q8P PRN  2145 AC 
 
  PO   0018
 
Oxycodone HCl 5 MG Q6P PRN  2215 AC 
 
  PO   
 
Patient Medication  1 ED .STK-MED ONE  1413 DC 
 
Teaching  ED  1414  
 
Sodium Chloride 1,000 ML Q6H  214 AC 
 
  IV   0438
 
Thiamine HCl 100 MG DAILY  1000 AC 
 
  PO   1125
 
 
 
 
Last 24 Hrs of Lab/Deven Results
Last 24 Hrs of Labs/Mics:
 Laboratory Tests
 
17 0700:
Anion Gap 2  L, Estimated GFR > 60, BUN/Creatinine Ratio 18.3
 
 
Assessment/Plan
Assessment:
Ms Rankin is a 26 year old female with alcohol abuse history who has been 
admitted for acute alcoholic withdrawal. She has has an extensive alcohol use 
history for over 15 years, and has recently been consuming at least 2 pints of 
vodka per day. 
 
#Alcohol Abuse/intoxication 
 Pt has history of seizure in summer of  while attempting to detox herself 
from EtOH. this time she is tolerating well, she scores 0 on CIWA score since 
yesterday 14:00
* We will Continue vitamins: thiamine, folate, MVI
* W'll Continue Ativan 1.5 mg Q6 for now and continue Ativan taper as per CIWA 
protocol.
* Zofran as needed for nausea.
* Fall and seizure precautions
* Tylenol for headache. 
 
#Transaminitis
 On admission , she had transaminitis with AST > ALT, this is most 
likely secondary to alcohol.  Hepatitis C and hepatitis B were negative.
* We'll repeat liver function today
 
#Mood Disturbance History
 Patient has history of depression, anxiety, and mild insomnia. psych is on 
board.
* We will Continue gabapentin for anxiety as per psych (off label)
* We will continue Melatonin 3 mg PRN. 
 
#History of Smoking
* Nicotine Patch 14 mg. 
 
Regular diet
DVT Ppx Lovenox
Full code
Problem List:
 1. EtOH dependence
 
 2. Depression
 
Pain Ratin
Pain Location:
na
Pain Goal: Remain pain free
Pain Plan:
see A&P
Tomorrow's Labs & Rationales:
see A&P
 
 
JONEL PATEL,TARAN 17 1011:
Attending MD Review Statement
 
Attending Statement
Attending MD Statement: examined this patient, discuss w/resident/PA/NP, agreed 
w/resident/PA/NP, reviewed EMR data (avail), discussed with nursing, reviewed 
images, amended to note
Attending Assessment/Plan:
Patient seen and examined.  Resting comfortably and not in any acute distress.  
No issues overnight reported by nursing staff.  CIWA score has been improving.  
On examination she is calm.  She is not agitated and tremulous.  She is 
hemodynamically stable.  I agree with continued taper of her benzodiazepine 
regimen.  Psychiatry consultation appreciated.  She should follow-up with the 
IOP program upon discharge.

## 2017-03-15 VITALS — DIASTOLIC BLOOD PRESSURE: 70 MMHG | SYSTOLIC BLOOD PRESSURE: 130 MMHG

## 2017-03-15 VITALS — SYSTOLIC BLOOD PRESSURE: 128 MMHG | DIASTOLIC BLOOD PRESSURE: 80 MMHG

## 2017-03-15 VITALS — DIASTOLIC BLOOD PRESSURE: 70 MMHG | SYSTOLIC BLOOD PRESSURE: 108 MMHG

## 2017-03-15 VITALS — DIASTOLIC BLOOD PRESSURE: 74 MMHG | SYSTOLIC BLOOD PRESSURE: 126 MMHG

## 2017-03-15 NOTE — PN- HOUSESTAFF
[facility] 0825[facility]
Subjective
Follow-up For:
Alcohol detox
Subjective:
Patient is afebrile and stable, no acute overnight events reported, she is 
laying on bed looks relaxed and comfortable.  She denies headache, nausea, 
hallucination, tremor, or feeling anxious.
 
Review of Systems
Constitutional:
Reports: no symptoms. 
 
Objective
Last 24 Hrs of Vital Signs/I&O
 Vital Signs
 
 
Date Time Temp Pulse Resp B/P Pulse O2 O2 Flow FiO2
 
     Ox Delivery Rate 
 
03/15 1407 99.5 97 20 128/80 97 Room Air  
 
03/15 0637 98.7 60 18 126/74 99 Room Air  
 
03/15 0400 98.4 78 18 130/70    
 
03/15 0000 98.4 78 18 130/70    
 
 2132 98.4 78 18 130/70 95 Room Air  
 
 2000 98.1 98 20 122/84    
 
 
 Intake & Output
 
 
 03/15 1600 03/15 0800 03/15 0000
 
Intake Total 1240 1560 800
 
Output Total   
 
Balance 1240 1560 800
 
    
 
Intake,  1200 
 
Intake, Oral 640 360 800
 
 
 
 
Physical Exam
General Appearance: Alert, Oriented X3, Cooperative, No Acute Distress
Skin: No Rashes
HEENT: Atraumatic, PERRLA, EOMI, Mucous Membr. moist/pink
Cardiovascular: Regular Rate, Normal S1, Normal S2, No Murmurs
Lungs: Clear to Auscultation, Normal Air Movement
Abdomen: Soft, No Tenderness
Neurological: Normal Speech
Extremities: No Clubbing, No Cyanosis, No Edema
Current Medications:
 Current Medications
 
 
  Sig/Rishabh Start time  Last
 
Medication Dose Route Stop Time Status Admin
 
Acetaminophen 650 MG Q6P PRN  2215 AC 
 
  PO   
 
Docusate Sodium 100 MG DAILY  1153 AC 03/15
 
  PO   0929
 
Enoxaparin Sodium 40 MG DAILY  1000 AC 03/15
 
  SC   0930
 
Folic Acid 1 MG DAILY  1000 AC 03/15
 
  PO   0929
 
Gabapentin 100 MG Q8P PRN  1915 AC 
 
  PO   
 
Lorazepam 0.5 MG Q6 03/15 1200 AC 03/15
 
  PO  1159  1249
 
Lorazepam 1 MG Q6  1200 DC 03/15
 
  PO   0530
 
Lorazepam 1 MG Q1P PRN  2145 AC 
 
  IV   
 
Lorazepam 2 MG Q2P PRN  2315 AC 
 
  PO   
 
Lorazepam 1 MG Q2P PRN  2315 AC 
 
  PO   1449
 
Multivitamins 1 TAB DAILY  1000 AC 03/15
 
  PO   0929
 
Nicotine 14 MG DAILY  2145 AC 03/15
 
  TOP   0931
 
Omeprazole 20 MG DAILY AC  0700 AC 03/15
 
  PO   0529
 
Ondansetron HCl 4 MG Q8P PRN  2145 AC 
 
  PO   0018
 
Oxycodone HCl 5 MG Q6P PRN  2215 AC 
 
  PO   
 
Sodium Chloride 1,000 ML Q6H  2145 DC 03/15
 
  IV   0530
 
Thiamine HCl 100 MG DAILY  1000 AC 03/15
 
  PO   0929
 
 
 
 
Last 24 Hrs of Lab/Deven Results
Last 24 Hrs of Labs/Mics:
 Laboratory Tests
 
03/15/17 0610:
Anion Gap 8, Estimated GFR > 60, BUN/Creatinine Ratio 16.7
 
 
Assessment/Plan
Assessment:
Ms Rankin is a 26 year old female with alcohol abuse history who has been 
admitted for acute alcoholic withdrawal. She has has an extensive alcohol use 
history for over 15 years, and has recently been consuming at least 2 pints of 
vodka per day. 
 
#Alcohol Abuse/intoxication 
 Pt has history of seizure in summer of  while attempting to detox herself 
from EtOH. this time she is tolerating well, she scores 0 on CIWA score since 
yesterday
* We will Continue vitamins: thiamine, folate, MVI
* Ativan was decreased to 0.5 mg every 6
* Zofran as needed for nausea.
* Fall and seizure precautions
* After tapering the Ativan agent will be discharged and plan to do outpatient 
rehabilitation for alcohol
 
#Transaminitis
 On admission , she had transaminitis with AST > ALT, this is most 
likely secondary to alcohol.  Hepatitis C and hepatitis B were negative.
* Her liver enzyme improved since admission, we may repeat one more time before 
discharge
 
#Mood Disturbance History
 Patient has history of depression, anxiety, and mild insomnia. psych is on 
board.
* We will Continue gabapentin for anxiety as per psych (off label)
* We will continue Melatonin 3 mg PRN. 
 
#History of Smoking
* Nicotine Patch 14 mg. 
 
Regular diet
DVT Ppx Lovenox
Full code
Problem List:
 1. Alcohol abuse
 
 2. EtOH dependence
 
 3. Depression
 
 4. Alcohol intoxication
 
Pain Ratin
Pain Location:
na 
Pain Goal: Remain pain free
Pain Plan:
see A&P
Tomorrow's Labs & Rationales:
see A&P
 
 
BERTHA PATEL,ARELY 03/15/17 1229:
Attending MD Review Statement
 
Attending Statement
Attending MD Statement: examined this patient, discuss w/resident/PA/NP, agreed 
w/resident/PA/NP, reviewed EMR data (avail), discussed with nursing, discussed 
with case mgmt, amended to note
Attending Assessment/Plan:
Patient seen and examined, feeling overall better.  Her CIWA scores are running 
in the low range.  At this point IV fluids will be discontinued.  Patient was 
encouraged to ambulate.  We will go down further on her Ativan..  Continue CIWA 
protocol and began and Ativan.  If patient continues to do well, likely can be 
discharged in the next 1-2 days.  She will need an outpatient rehabilitation for
etoh.

## 2017-03-16 VITALS — DIASTOLIC BLOOD PRESSURE: 68 MMHG | SYSTOLIC BLOOD PRESSURE: 132 MMHG

## 2017-03-16 VITALS — SYSTOLIC BLOOD PRESSURE: 110 MMHG | DIASTOLIC BLOOD PRESSURE: 80 MMHG

## 2017-03-16 NOTE — PN- HOUSESTAFF
**See Addendum**
Subjective
Follow-up For:
Alcohol detox
Subjective:
Stable, no acute overnight events reported, looks relaxed and comfortable.  She 
denies tremor, nausea, hallucination, anxiety or headache.  She is scoring 0-4 
on CIWA during the past 2 days with most of her scores pain 0.  Patient would 
like to be discharged today.
 
Review of Systems
Constitutional:
Reports: no symptoms.  Denies: see HPI. 
 
Objective
Last 24 Hrs of Vital Signs/I&O
 Vital Signs
 
 
Date Time Temp Pulse Resp B/P Pulse O2 O2 Flow FiO2
 
     Ox Delivery Rate 
 
 0640 99.2 77 18 132/68 100 Room Air  
 
03/15 2253 98.9 91 20 108/70 98   
 
03/15 1407 99.5 97 20 128/80 97 Room Air  
 
 
 Intake & Output
 
 
  1600  0800  0000
 
Intake Total  120 480
 
Output Total   
 
Balance  120 480
 
    
 
Intake, Oral  120 480
 
 
 
 
Physical Exam
General Appearance: Alert, Oriented X3, Cooperative, No Acute Distress
Skin: No Rashes
HEENT: Atraumatic, PERRLA, EOMI, Mucous Membr. moist/pink
Cardiovascular: Regular Rate, Normal S1, Normal S2, No Murmurs
Lungs: Clear to Auscultation, Normal Air Movement
Abdomen: Soft, No Tenderness
Neurological: Normal Speech
Extremities: No Clubbing, No Cyanosis, No Edema
Current Medications:
 Current Medications
 
 
  Sig/Rishabh Start time  Last
 
Medication Dose Route Stop Time Status Admin
 
Acetaminophen 650 MG Q6P PRN  2215 AC 
 
  PO   
 
Docusate Sodium 100 MG DAILY  1153 AC 03/15
 
  PO   0929
 
Enoxaparin Sodium 40 MG DAILY  1000 AC 03/15
 
  SC   0930
 
Folic Acid 1 MG DAILY  1000 AC 03/15
 
  PO   0929
 
Gabapentin 100 MG Q8P PRN  1915 AC 
 
  PO   
 
Lorazepam 0.5 MG Q6 03/15 1200 AC 
 
  PO  1159  0524
 
Lorazepam 1 MG Q6  1200 DC 03/15
 
  PO   0530
 
Lorazepam 1 MG Q1P PRN  2145 AC 
 
  IV   
 
Lorazepam 2 MG Q2P PRN  2315 AC 
 
  PO   
 
Lorazepam 1 MG Q2P PRN  2315 AC 
 
  PO   1449
 
Multivitamins 1 TAB DAILY  1000 AC 03/15
 
  PO   0929
 
Nicotine 14 MG DAILY  2145 AC 03/15
 
  TOP   0931
 
Omeprazole 20 MG DAILY AC  0700 AC 
 
  PO   0524
 
Ondansetron HCl 4 MG Q8P PRN  2145 AC 
 
  PO   0018
 
Oxycodone HCl 5 MG Q6P PRN  2215 AC 
 
  PO   
 
Patient Medication  1 ED .STK-MED ONE 03/15 1336 AL 
 
Teaching  ED 03/15 1337  
 
Polyethylene Glycol 17 GM DAILY 03/15 1827 AC 03/15
 
  PO   1922
 
Senna 187 MG AT BEDTIME PRN 03/15 1830 AC 03/15
 
  PO   2159
 
Sodium Chloride 1,000 ML Q6H  214 DC 03/15
 
  IV   0530
 
Thiamine HCl 100 MG DAILY  1000 AC 03/15
 
  PO   0929
 
 
 
 
Last 24 Hrs of Lab/Deven Results
Last 24 Hrs of Labs/Mics:
 Laboratory Tests
 
17 0800:
Sodium Pending, Potassium Pending, Chloride Pending, Carbon Dioxide Pending, 
Anion Gap Pending, BUN Pending, Creatinine Pending, BUN/Creatinine Ratio Pending
 
 
Assessment/Plan
Assessment:
Ms Rankin is a 26 year old female with alcohol abuse history who has been 
admitted for acute alcoholic withdrawal. She has has an extensive alcohol use 
history for over 15 years, and has recently been consuming at least 2 pints of 
vodka per day. 
 
#Alcohol Abuse/intoxication 
 Pt has history of seizure in summer of  while attempting to detox herself 
from EtOH. this time she is tolerating well, she scores 0-4 on CIWA score for 
the past 2 days
* We will Continue vitamins: thiamine, folate, MVI
* Ativan was decreased to 0.5 mg every 12
* Zofran as needed for nausea.
* Fall and seizure precautions
* After tapering the Ativan agent will be discharged and plan to do outpatient 
rehabilitation for alcohol
 
#Transaminitis
 On admission , she had transaminitis with AST > ALT, this is most 
likely secondary to alcohol.  Hepatitis C and hepatitis B were negative. Her 
liver enzyme improved since admission.
* Patient will be advised to follow up with her primary care doctor within 2 
weeks
 
#Mood Disturbance History
 Patient has history of depression, anxiety, and mild insomnia. psych is on 
board.
* We will Continue gabapentin for anxiety as per psych (off label)
* We will continue Melatonin 3 mg PRN. 
 
#History of Smoking
* Nicotine Patch 14 mg. 
 
Regular diet
DVT Ppx Lovenox
Full code
Problem List:
 1. EtOH dependence
 
 2. Alcohol abuse
 
Pain Ratin
Pain Location:
NA
Pain Goal: Remain pain free
Pain Plan:
See assessment and plan
Tomorrow's Labs & Rationales:
See assessment and plan

## 2017-03-17 NOTE — DISCHARGE SUMMARY
Visit Information
 
Visit Dates
Admission Date:
03/12/17
 
Discharge Date:
03/16/17
 
 
Hospital Course
 
Course
Attending Physician:
ARELY STONE MD
 
Primary Care Physician:
PATIENT HAS NO PRIMARY CARE DR
 
Hospital Course:
Ms Rankin is a 26 year old female with long history of alcohol abuse, who 
presented for EtOH detox. Patient has history of seizure in summer of 2016 while
attempting to detox herself from EtOH.
 
1.EtOH detox
 Pt was treated with Lorazepam as per [facility] protocol. She recieved Zofran 4 mg 
every 4-6 hours for nausea and vomiting. She was given thiamine, folate, and 
MVI.  Patient tolerated the detox well, she did not complain of seizure, 
hallucination, hypertension, or tremor.
 
2.Transaminitis
 On admission was found to has transaminitis with AST > ALT, this is most likely
secondary to alcohol. Hepatitis C and hepatitis B were negative. AST and ALT was
trended down.  She was advised to follow up with her primary care doctor within 
2 weeks
 
3.#Anxiety
 Was managed with gabapentin for anxiety as per psych (off label). The patient 
has agreed to come to [facility] after her alcohol detox is complete. She was 
insturcted to follow up with them.
 
 
Allergies:
Coded Allergies:
NO KNOWN ALLERGIES (05/21/14)
 
 
Disposition Summary
 
Disposition
Principal Diagnosis:
Alcohol detox
Additional Diagnosis:
Anxiety
Discharge Disposition: home or self care
 
Discharge Instructions
 
General Discharge Information
Code Status: Full Code
Patient's Diet:
Regular
Patient's Activity:
As tolerated
Follow-Up Instructions/Appts:
please follow up with pcp 
2.please follow up with [facility] post discharge 
 
Medications at Discharge
Discharge Medications:
Start taking the following new medications:
Gabapentin (Gabapentin) 100 MG CAPSULE
    100 Milligram ORAL EVERY 8 HOURS AS NEEDED as needed for ANXIETY
    Days = 7
    No Refills
    Instructions:
       please take this medication every 8 hours if you feel anxious.
    Comments:
       NOT GIVEN
 
Lorazepam (Ativan) 0.5 MG TABLET
    0.5 Milligram ORAL EVERY 12 HOURS
    Qty = 2
    No Refills
    Instructions:
       please take this medication at 10 PM tonight and the next and final
       dose will be 10 AM tomorrow
    Comments:
       Last Taken:3/16/17
             Time:12:30 PM
 
Folic Acid (Folic Acid) 1 MG TABLET
    1 Milligram ORAL DAILY
    Days = 30
    No Refills
    Comments:
       Last Taken:3/16/17
             Time:9 AM
 
Thiamine HCl (Vitamin B-1) 100 MG TABLET
    100 Milligram ORAL DAILY
    Days = 30
    No Refills
    Comments:
       Last Taken:3/16/17
             Time:9 AM
 
Multivitamin (One Daily Multivitamin) 1 EACH TABLET
    1 Tablet ORAL DAILY
    Days = 30
    No Refills
    Comments:
       Last Taken:3/16/17
             Time:9 AM
 
 
Copies To:
LUIS F CASTILLO

## 2017-04-05 ENCOUNTER — HOSPITAL ENCOUNTER (EMERGENCY)
Dept: HOSPITAL 68 - ERH | Age: 27
LOS: 1 days | End: 2017-04-06
Payer: COMMERCIAL

## 2017-04-05 VITALS — HEIGHT: 64 IN | WEIGHT: 140 LBS | BODY MASS INDEX: 23.9 KG/M2

## 2017-04-05 DIAGNOSIS — F10.129: Primary | ICD-10-CM

## 2017-04-05 LAB
ABSOLUTE GRANULOCYTE CT: 3.9 /CUMM (ref 1.4–6.5)
BASOPHILS # BLD: 0.1 /CUMM (ref 0–0.2)
BASOPHILS NFR BLD: 0.8 % (ref 0–2)
EOSINOPHIL # BLD: 0.3 /CUMM (ref 0–0.7)
EOSINOPHIL NFR BLD: 3.2 % (ref 0–5)
ERYTHROCYTE [DISTWIDTH] IN BLOOD BY AUTOMATED COUNT: 13.6 % (ref 11.5–14.5)
GRANULOCYTES NFR BLD: 49.3 % (ref 42.2–75.2)
HCT VFR BLD CALC: 40.3 % (ref 37–47)
LYMPHOCYTES # BLD: 3 /CUMM (ref 1.2–3.4)
MCH RBC QN AUTO: 30.9 PG (ref 27–31)
MCHC RBC AUTO-ENTMCNC: 33.2 G/DL (ref 33–37)
MCV RBC AUTO: 93.1 FL (ref 81–99)
MONOCYTES # BLD: 0.7 /CUMM (ref 0.1–0.6)
PLATELET # BLD: 275 /CUMM (ref 130–400)
PMV BLD AUTO: 6.7 FL (ref 7.4–10.4)
RED BLOOD CELL CT: 4.34 /CUMM (ref 4.2–5.4)
WBC # BLD AUTO: 8 /CUMM (ref 4.8–10.8)

## 2017-04-05 PROCEDURE — G0480 DRUG TEST DEF 1-7 CLASSES: HCPCS

## 2017-04-05 NOTE — ED PSYCHIATRIC COMPLAINT
History of Present Illness
 
General
Chief Complaint: ETOH/Drug Related Complaint
Stated Complaint: PT IS HERE FOR DETOX FROM ALOCHOL
Source: patient
Exam Limitations: no limitations
 
Vital Signs & Intake/Output
Vital Signs & Intake/Output
 Vital Signs
 
 
Date Time Temp Pulse Resp B/P Pulse O2 O2 Flow FiO2
 
     Ox Delivery Rate 
 
04/06 1804 98.2 84 16 117/71    
 
04/06 1745 98.2 84 16 117/71    
 
04/06 1537 98.5 76 16 128/65    
 
04/06 1536 98.2 76 16 128/65    
 
04/06 1248 94.8 80 16 120/66    
 
04/06 1246 94.8 80 16 120/66    
 
04/06 0930 96.0 95 16 107/55    
 
04/06 0930 96.0 95 16 107/55 97 Room Air  
 
 
Allergies
Coded Allergies:
NO KNOWN ALLERGIES (05/21/14)
 
Reconcile Medications
Folic Acid 1 MG TABLET   1 MG PO DAILY nutritional supplements
Gabapentin 100 MG CAPSULE   100 MG PO Q8P PRN ANXIETY
     please take this medication every 8 hours if you feel anxious.
Lorazepam (Ativan) 0.5 MG TABLET   0.5 MG PO Q12 alcohol detox
     please take this medication at 10 PM tonight and the next and final
     dose will be 10 AM tomorrow
Multivitamin (One Daily Multivitamin) 1 EACH TABLET   1 TAB PO DAILY nutritional
supplements
Thiamine HCl (Vitamin B-1) 100 MG TABLET   100 MG PO DAILY nutritional 
supplements
 
Triage Note:
RECEIVED 25 YO FEMALE REQUESTING DETOX FROM
 ALCOHOL. PT LAST DRANK ABOUT ONE HOUR PTA. PT
 DRINKS APPROX 1 TO 1 1/2 PINTS A DAY. PT REPORTED
 SHE HAD A WITHDRAWL SEIZURE ABOUT 6 MONTHS AGO.
Triage Nurses Notes Reviewed? yes
Pregnant: No
Patient currently breastfeeds: No
HPI:
Patient presents for evaluation of alcohol intoxication, dependence and need for
detoxification.  Patient states she drinks 1/2-2 pints of vodka daily and has 
been doing so over the past 12 years.  She states her last detox was at [facility] roughly 1 month ago.  Last use of alcohol was about 60 minutes prior to
arrival.  She denies associated drug use but does smoke about 2 packs of 
cigarettes per week.  She has no specific complaint currently other than alcohol
dependence.  She states she did have one seizure about 6 months ago but denies 
that it was relative to alcohol withdrawal.  The cause of that particular 
seizure is unknown.  Her past medical history she denies past history but states
that as the result of her prior detoxification admission she was placed on 2 
unknown medications that she simply takes.
(BRENDA REYNA MD)
 
Past History
 
Travel History
Traveled to [place] past 21 day No
 
Medical History
Any Pertinent Medical History? see below for history
Neurological: NONE
EENT: NONE
Cardiovascular: NONE
Respiratory: NONE
Gastrointestinal: NONE
Hepatic: NONE
Renal: NONE
Musculoskeletal: NONE
Psychiatric: alcohol dependence, depression, R/O bipolar R/O bulemia
Endocrine: NONE
Blood Disorders: NONE
Cancer(s): NONE
History of MRSA: No
History of VRE: No
History of CDIFF: No
 
Surgical History
Surgical History: none
 
Psychosocial History
Who do you live with Family
What is your primary language English
Tobacco Use: Current Daily Use
Daily Tobacco Use Amount/Type: => 5 Cigarettes daily
ETOH Use: alcoholic
 
Family History
Hx Contributory? No
(BRENDA REYNA MD)
 
Review of Systems
 
 
Physical Exam
 
Physical Exam
General Appearance: SEE BELOW
Neurological/Psychiatric: SEE BELOW
Comments:
Gen.: Well-nourished, well-developed, no acute respiratory distress.  EtOH like 
odor with mildly slurred speech.
Head: Normocephalic, atraumatic.
Eyes: Normal inspection bilaterally
Ears: Normal inspection bilaterally
Nose: Normal inspection
Throat/mouth : Moist mucosa 
Neck: Supple, full range of motion, no goiter
Heart: Regular rate and rhythm, no murmurs rubs or gallops
Lungs: Clear to auscultation bilaterally with normal air entry
Chest: Nontender
Back: Normal range of motion
Abdomen: Soft, nontender, nondistended, normal bowel sounds
Extremities: Normal range of motion grossly, equal radial pulses, no cyanosis 
clubbing or edema
Neurologic: Cranial nerves grossly intact, speech is clear
Skin: warm and dry
Psychiatric: Calm, reluctantly cooperative, no apparent delusions or 
hallucinations, sarcastic in timeS
 
SAD PERSONS Done? patient not suicidal
(BRENDA REYNA MD)
 
Progress
Differential Diagnosis: drug intoxication
Plan of Care:
 Laboratory Tests
 
 
 
04/06/17 1230:
Urine Opiates Screen < 100.00, Methadone Screen < 40, Barbiturate Screen < 60, 
Ur Phencyclidine Scrn < 6.00, Amphetamines Screen < 100, U Benzodiazepines Scrn 
< 85, Urine Cocaine Screen < 50, Urine Cannabis Screen 6.90, Urine Pregnancy 
Test NEGATIVE
 
4/5/2017 11:50:46 PM
Patient signed out to me by Dr. Reyna.  Patient requesting detox.  Currently 
intoxicated and belligerent to staff.  We'll continue to monitor at this time.
(ESA NOLAN MD)
Comments:
4/5/2017 11:35:00 PM patient signed out to Dr. Nolan at shift change over.  
Patient has shown increasing sarcasm and belligerence.  The patient refused to 
surrender her personal belongings and became agitated when they were being 
secured.  She attempted to throw one of her books.  Her personal belongings were
secured and the patient offered something to help her sleep.  She insists that 
she receive her book in her blankets but given her prior actions we cannot 
safely provide these to her.  I will order trazodone to help her sleep through 
the evening and will reassess her on ongoing basis regarding any acute alcohol 
withdrawal.
(BRENDA REYNA MD)
Hand-Off
   Endorsed To:
CIRILO BOSE MD
   Endorsed Time: 0700
   Pending: consult (CASE MANAGEMENT HUSKY DETOX), other (SOBRIETY)
(ESA NOLAN MD)
 
Departure
 
Departure
Condition: Stable
Referrals:
PATIENT HAS NO PRIMARY CARE DR (PCP/Family)
 
Departure Forms:
Customer Survey
General Discharge Information
(BRENDA REYNA MD)
 
Departure
Clinical Impression
Primary Impression: Alcohol intoxication
(ESA NOLAN MD)
 
Departure
Disposition: HOME OR SELF CARE
Additional Instructions:
return for any concerns 
(CIRILO BOSE MD)
 
 
 Current Medications
 
 
  Sig/Rishabh Start time  Last
 
Medication Dose  Stop Time Status Admin
 
Ondansetron HCl 4 MG ONCE ONE 04/06 1615 UNVr 
 
(Zofran)   04/06 1616  
 
Diphenhydramine HCl 25 MG ONCE ONE 04/05 2345 CAN 
 
(Benadryl)   04/05 2346  
 
Haloperidol 5 MG ONCE ONE 04/05 2345 CAN 
 
(Haldol)   04/05 2346  
 
Lorazepam 2 MG ONCE ONE 04/05 2345 CAN 
 
(Ativan)   04/05 2346  
 
 
 Laboratory Tests
 
 
 
04/06/17 1230:
Urine Opiates Screen < 100.00, Methadone Screen < 40, Barbiturate Screen < 60, 
Ur Phencyclidine Scrn < 6.00, Amphetamines Screen < 100, U Benzodiazepines Scrn 
< 85, Urine Cocaine Screen < 50, Urine Cannabis Screen 6.90, Urine Pregnancy 
Test NEGATIVE
 
04/05/17 2239:
Anion Gap 17  H, Estimated GFR > 60, BUN/Creatinine Ratio 20.0, Glucose 109  H, 
Calcium 8.9, Total Bilirubin 0.4, AST 53  H, ALT 54  H, Alkaline Phosphatase 56,
Total Protein 6.9, Albumin 4.1, Globulin 2.8, Albumin/Globulin Ratio 1.5, CBC w 
Diff NO MAN DIFF REQ, RBC 4.34, MCV 93.1, MCH 30.9, RDW 13.6, MPV 6.7  L, Gran %
49.3, Lymphocytes % 37.9, Monocytes % 8.8, Eosinophils % 3.2, Basophils % 0.8, 
Absolute Granulocytes 3.9, Absolute Lymphocytes 3.0, Absolute Monocytes 0.7  H, 
Absolute Eosinophils 0.3, Absolute Basophils 0.1, PUBS MCHC 33.2, Serum Alcohol 
411.0
 
4/5/2017 11:50:46 PM
Patient signed out to me by Dr. Reyna.  Patient requesting detox.  Currently 
intoxicated and belligerent to staff.  We'll continue to monitor at this time.
(JENNA PATEL,ESA)
Comments:
4/5/2017 11:35:00 PM patient signed out to Dr. Nolan at shift change over.  
Patient has shown increasing sarcasm and belligerence.  The patient refused to 
surrender her personal belongings and became agitated when they were being 
secured.  She attempted to throw one of her books.  Her personal belongings were
secured and the patient offered something to help her sleep.  She insists that 
she receive her book in her blankets but given her prior actions we cannot 
safely provide these to her.  I will order trazodone to help her sleep through 
the evening and will reassess her on ongoing basis regarding any acute alcohol 
withdrawal.
(CHUYTIA PATEL,BRENDA WALDROP)
Hand-Off
   Endorsed To:
LIDYA PATEL,CIRILO QUINONES.
   Endorsed Time: 0700
   Pending: consult (CASE MANAGEMENT HUSKY DETOX), other (SOBRIETY)
(ESA NOLAN MD)
 
Departure
 
Departure
Condition: Stable
Referrals:
PATIENT HAS NO PRIMARY CARE DR (PCP/Family)
 
Departure Forms:
Customer Survey
General Discharge Information
(CHUYITA PATEL,BRENDA WALDROP)
 
Departure
Clinical Impression
Primary Impression: Alcohol intoxication
(JENNA PATEL,ESA)
 
Departure
Disposition: HOME OR SELF CARE
Additional Instructions:
return for any concerns 
(LIDYA PATEL,CIRILO HICKS)

## 2017-04-06 VITALS — SYSTOLIC BLOOD PRESSURE: 117 MMHG | DIASTOLIC BLOOD PRESSURE: 71 MMHG

## 2018-06-07 ENCOUNTER — HOSPITAL ENCOUNTER (OUTPATIENT)
Dept: HOSPITAL 68 - ERH | Age: 28
Setting detail: OBSERVATION
LOS: 1 days | End: 2018-06-08
Attending: EMERGENCY MEDICINE | Admitting: EMERGENCY MEDICINE
Payer: COMMERCIAL

## 2018-06-07 VITALS — DIASTOLIC BLOOD PRESSURE: 68 MMHG | SYSTOLIC BLOOD PRESSURE: 123 MMHG

## 2018-06-07 DIAGNOSIS — F10.229: Primary | ICD-10-CM
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
## 2018-06-07 DIAGNOSIS — F10.239: ICD-10-CM

## 2018-06-07 LAB
ABSOLUTE GRANULOCYTE CT: 4.2 /CUMM (ref 1.4–6.5)
BASOPHILS # BLD: 0.1 /CUMM (ref 0–0.2)
BASOPHILS NFR BLD: 1.3 % (ref 0–2)
EOSINOPHIL # BLD: 0.2 /CUMM (ref 0–0.7)
EOSINOPHIL NFR BLD: 2.9 % (ref 0–5)
ERYTHROCYTE [DISTWIDTH] IN BLOOD BY AUTOMATED COUNT: 14.2 % (ref 11.5–14.5)
GRANULOCYTES NFR BLD: 59 % (ref 42.2–75.2)
HCT VFR BLD CALC: 39.6 % (ref 37–47)
LYMPHOCYTES # BLD: 2.1 /CUMM (ref 1.2–3.4)
MCH RBC QN AUTO: 31.1 PG (ref 27–31)
MCHC RBC AUTO-ENTMCNC: 33.8 G/DL (ref 33–37)
MCV RBC AUTO: 91.9 FL (ref 81–99)
MONOCYTES # BLD: 0.5 /CUMM (ref 0.1–0.6)
PLATELET # BLD: 324 /CUMM (ref 130–400)
PMV BLD AUTO: 6.5 FL (ref 7.4–10.4)
RED BLOOD CELL CT: 4.31 /CUMM (ref 4.2–5.4)
WBC # BLD AUTO: 7.2 /CUMM (ref 4.8–10.8)

## 2018-06-07 PROCEDURE — G0480 DRUG TEST DEF 1-7 CLASSES: HCPCS

## 2018-06-07 PROCEDURE — G0378 HOSPITAL OBSERVATION PER HR: HCPCS

## 2018-06-07 NOTE — ED PSYCHIATRIC COMPLAINT
History of Present Illness
 
General
Chief Complaint: ETOH/Drug Related Complaint
Stated Complaint: REQ ETOH DETOX
Source: patient, family, old records
Exam Limitations: intoxication
 
Vital Signs & Intake/Output
Vital Signs & Intake/Output
 Vital Signs
 
 
Date Time Temp Pulse Resp B/P B/P Pulse O2 O2 Flow FiO2
 
     Mean Ox Delivery Rate 
 
06/08 0800 98.7 98 18 105/62     
 
06/08 0800 98.7 98 18 105/62  97   
 
06/08 0625 97.2 82 18 127/60     
 
06/08 0622 97.2 82 18 127/60  97   
 
06/08 0604    107/68     
 
06/08 0320 98.1 88 20 107/68     
 
06/08 0242 98.1 88 20 107/68  99 Room Air  
 
06/07 2138  115  123/68     
 
06/07 2119 99.4 115 18 123/68     
 
06/07 2119 99.4 115 20 123/68  96   
 
06/07 2056       Room Air  
 
06/07 1927 98.8 120 24 155/88  98   
 
 
 ED Intake and Output
 
 
 06/08 0000 06/07 1200
 
Intake Total 0 
 
Output Total 0 
 
Balance 0 
 
   
 
Intake, Oral 0 
 
Output, Urine 0 
 
Patient 140 lb 
 
Weight  
 
 
 
Allergies
Coded Allergies:
NO KNOWN ALLERGIES (05/21/14)
 
Reconcile Medications
Folic Acid 1 MG TABLET   1 MG PO DAILY nutritional supplements
Gabapentin 100 MG CAPSULE   100 MG PO Q8P PRN ANXIETY
     please take this medication every 8 hours if you feel anxious.
Lorazepam (Ativan) 0.5 MG TABLET   0.5 MG PO Q12 alcohol detox
     please take this medication at 10 PM tonight and the next and final
     dose will be 10 AM tomorrow
Multivitamin (One Daily Multivitamin) 1 EACH TABLET   1 TAB PO DAILY nutritional
supplements
Thiamine HCl (Vitamin B-1) 100 MG TABLET   100 MG PO DAILY nutritional 
supplements
 
Triage Note:
PER PT "WANT DETOX"
SPEECH VERY THICK , GAIT EXTREMELY UNSTEADY
INITIALLY REPORTS LAST DRINK YESTERDAY BUT THEN
RECANTS AND STATES 2 PINTS TODAY.
REPORTS HX OF ETOH SEIZURE.
Triage Nurses Notes Reviewed? yes
Onset: Just prior to arrival
Duration: week(s):, constant, continues in ED
Timing: recent history
Severity: severe
Associated Symptoms: impaired concentration, insomnia
LMP (ages 10-50): unknown
Pregnant: No
Patient currently breastfeeds: No
HPI:
The patient has had long-standing issues with alcohol dependence and nightly is 
been drinking so much she's not left the house with difficulty sleeping eating.
2 years prior to admission she had alcohol withdrawal seizure.  Her last detox 
was one year ago.
She denies fever chills nausea vomiting diarrhea abdominal pain chest pain 
shortness breath headache dysuria rash bleeding suicidal ideation homicidal 
ideation hallucination.
Her last drink was prior to admission.
(Robbie Pascual MD)
 
Past History
 
Travel History
Traveled to [place] past 21 day No
 
Medical History
Any Pertinent Medical History? see below for history
Neurological: NONE
EENT: NONE
Cardiovascular: NONE
Respiratory: NONE
Gastrointestinal: NONE
Hepatic: NONE
Renal: NONE
Musculoskeletal: NONE
Psychiatric: alcohol dependence, depression, R/O bipolar R/O bulemia
Endocrine: NONE
Blood Disorders: NONE
Cancer(s): NONE
History of MRSA: No
History of VRE: No
History of CDIFF: No
 
Surgical History
Surgical History: none
 
Psychosocial History
Who do you live with Family
What is your primary language English
Tobacco Use: Never used
 
Family History
Hx Contributory? No
(Robbie Pascual MD)
 
Review of Systems
 
Review of Systems
Constitutional:
Reports: no symptoms. 
EENTM:
Reports: no symptoms. 
Respiratory:
Reports: no symptoms. 
Cardiovascular:
Reports: no symptoms. 
GI:
Reports: no symptoms. 
Genitourinary:
Reports: no symptoms. 
Musculoskeletal:
Reports: no symptoms. 
Skin:
Reports: no symptoms. 
Neurological/Psychological:
Reports: see HPI, cognitive dysfunction, emotional problems. 
Hematologic/Endocrine:
Reports: no symptoms. 
Immunologic/Allergic:
Reports: no symptoms. 
All Other Systems: Reviewed and Negative
(Robbie Pascual MD)
 
Physical Exam
 
Physical Exam
General Appearance: well developed/nourished, alert, awake, anxious, mild 
distress, intoxicated
Head: atraumatic, normal appearance
Eyes:
Bilateral: normal appearance, PERRL, EOMI. 
Ears, Nose, Throat: normal pharynx, normal ENT inspection, hearing grossly 
normal
Neck: normal inspection, supple, full range of motion, no midline tenderness
Respiratory: normal breath sounds, chest non-tender, no respiratory distress, 
quiet respiration, lungs clear
Cardiovascular: regular rate/rhythm, normal peripheral pulses, norml femoral 
pulses equa
Gastrointestinal: normal bowel sounds, soft, non-tender, no organomegaly
Extremities: normal range of motion, no ligament instability
Neurological/Psychiatric: no motor/sensory deficits, awake, alert, anxious, CNs 
II-XII nml as tested
Appearance/Memory/Insight: disheveled, impaired insight
Behavoir/Eye Contact/Speech: cooperative, decreased rate of speech
Thoughts/Hallucinations: no apparent hallucination
Skin: intact, normal color, warm/dry
SAD PERSONS Done? patient not suicidal
CAGE (2/more=possible [place]
 
 
CAGE (2/more=possible [place] Response Value
 
Cut Down? yes 1
 
Annoyed? yes 1
 
Guilty? yes 1
 
Eye Opener? yes 1
 
Total   4
 
 
(Samara PATEL,Robbie)
 
Progress
Differential Diagnosis: drug intoxication, drug overdose, drug withdrawal, 
electrolyte abnormality, hypoglycemia
Plan of Care:
 Orders
 
 
Procedure Date/time Status
 
Regular Diet 06/08 B Active
 
Discharge Patient 06/08 0932 Active
 
Patient Safety Monitor 06/08 0355 Active
 
OXYGEN SETUP (GEN) 06/08 0001 Active
 
Saline Lock 06/08 0001 Active
 
Place in observation 06/08 0001 Active
 
Patient Data 06/08 0001 Active
 
Vital Signs 06/08 0001 Active
 
Activity/Ambulation 06/08 0001 Active
 
Code Status 06/08 0001 Active
 
Patient Safety Monitor 06/07 2355 Active
 
Intake & Output 06/07 2020 Active
 
Patient Safety Monitor 06/07 2000 Active
 
CIWA 06/07 2000 Active
 
URINE PREGNANCY 06/07 2000 Complete
 
URINE DRUG SCREEN FOR ER ONLY 06/07 2000 Complete
 
LIPASE 06/07 2000 Complete
 
ETHANOL 06/07 2000 Complete
 
COMPREHENSIVE METABOLIC PANEL 06/07 2000 Complete
 
CBC WITHOUT DIFFERENTIAL 06/07 2000 Complete
 
CASE MANAGEMENT CONSULT 06/07 2000 Active
 
 
 Current Medications
 
 
  Sig/Rishabh Start time  Last
 
Medication Dose  Stop Time Status Admin
 
Folic Acid 1 MG AT BEDTIME 06/08 2100 AC 
 
(Folic Acid)     
 
Multivitamins 1 TAB AT BEDTIME 06/08 2100 AC 
 
(Theragran Vitamins)     
 
Thiamine HCl 100 MG AT BEDTIME 06/08 2100 AC 
 
(Vitamin B1)     
 
Clonidine 0.1 MG Q8 06/07 2000 AC 06/08
 
(Catapres)     0604
 
Gabapentin 300 MG Q8 06/07 2000 AC 06/08
 
(Neurontin)     0604
 
 
 Laboratory Tests
 
 
 
06/07/18 2048:
Anion Gap 17  H, Estimated GFR > 60, BUN/Creatinine Ratio 16.0, Glucose 103  H, 
Calcium 9.4, Total Bilirubin 0.2, AST 23, ALT 23, Alkaline Phosphatase 55, Total
Protein 6.6, Albumin 3.9, Globulin 2.7, Albumin/Globulin Ratio 1.4, Lipase 238, 
CBC w Diff NO MAN DIFF REQ, RBC 4.31, MCV 91.9, MCH 31.1  H, MCHC 33.8, RDW 14.2
, MPV 6.5  L, Gran % 59.0, Lymphocytes % 29.4, Monocytes % 7.4, Eosinophils % 
2.9, Basophils % 1.3, Absolute Granulocytes 4.2, Absolute Lymphocytes 2.1, 
Absolute Monocytes 0.5, Absolute Eosinophils 0.2, Absolute Basophils 0.1, Serum 
Alcohol 487.0
 
06/07/18 2037:
Urine Opiates Screen < 100, Methadone Screen < 40, Barbiturate Screen < 60, Ur 
Phencyclidine Scrn < 6.00, Amphetamines Screen < 100, U Benzodiazepines Scrn < 
85, Urine Cocaine Screen < 50, Urine Cannabis Screen < 5.00, Urine Pregnancy 
Test NEGATIVE
 
06/07/18 2002:
Lipase Cancelled
 
Hand-Off
   Endorsed To:
Edilson Reyna MD
   Endorsed Time: 0700
   Pending: consult (case mgmt)
(Robbie Pascual MD)
 
Departure
 
Departure
Condition: Stable
Referrals:
Patient Has No Primary Care Dr (PCP/Family)
 
(Robbie Pascual MD)
 
Departure
Disposition: HOME OR SELF CARE
Clinical Impression
Primary Impression: Alcohol dependence with intoxication
Qualifiers:  Complication of substance-induced condition: uncomplicated 
Qualified Code: F10.220 - Alcohol dependence with intoxication, uncomplicated
Additional Instructions:
Ativan as prescribed for withdrawal symptoms.  Follow-up with a detox program as
soon as possible.  Notify your primary care physician of this emergency 
department visit and treatment plan and arrange for follow-up appointment for 
general medical evaluation as soon as possible
 
If you do not currently have a primary care physician then please contact the 
[facility] primary care practice at the following phone number: [phone].
 
Return if any concerns or sudden worsening.
Departure Forms:
Customer Survey
DETOX FACILITIES LIST
General Discharge Information
Prescriptions:
Current Visit Scripts
LORazepam (Ativan) 1 TAB PO TID PRN ALCHOHOL WITHDRAWAL 
     #16 TAB 
     DAY 1: 2 TAB 3X/DAY
     DAY 2: 1 TAB 4X/DAY
     DAY 3: 1 TAB 3X/DAY
     DAY 4: 1 TAB 2X/DAY
     DAY 5: 1 TAB
 
 
(Edilson Reyna MD)
 
ED Attending Observation
Initial Observation Note:
I have seen and personally examined DUNCAN GONSALEZ 
on 06/08/18 at 0001. 
 
I agree with the current emergency department documentation.  The disposition 
(admission or discharge) is uncertain at this time, she needs a period of 
observation for the following reason(s): alcohol dependence and intoxication for
detoxification
 
The ED Nurse caring for this patient has been personally informed as to what the
patient is being observed for.
 
(Samara PATEL,Robbie)
Initial Observation Note:
I have seen and personally examined DUNCAN GONSALEZ 
on 06/08/18 at 0932. 
 
I agree with the current emergency department documentation.  The disposition 
(admission or discharge) is uncertain at this time, she needs a period of 
observation for the following reason(s): 
 
The ED Nurse caring for this patient has been personally informed as to what the
patient is being observed for.
 
Observation Re-Evaluation:
I have reevaluated DUNCAN GONSALEZ on 06/08/18 at 0932.
 
The physical findings that support the continued need to observe this patient 
include .
 
Observation Discharge:
I have reevaluated DUNCAN GONSALEZ on 06/08/18 at 0932.
 
The patient is:
(X): Stable for discharge
(): To be admitted to Nursing Floor
(): To be placed in Observation on Nursing Floor
(): For transfer to other facility
 
The patient was being observed for serious/life-threatening acute alcohol 
withdrawal.
 
As a result of that observation, I have determined the patient is stable for 
outpatient management.
 
(Maribell PATEL,Edilson OLIVEIRA)

## 2018-06-08 VITALS — SYSTOLIC BLOOD PRESSURE: 105 MMHG | DIASTOLIC BLOOD PRESSURE: 62 MMHG

## 2018-06-08 VITALS — DIASTOLIC BLOOD PRESSURE: 68 MMHG | SYSTOLIC BLOOD PRESSURE: 107 MMHG

## 2018-06-08 VITALS — DIASTOLIC BLOOD PRESSURE: 62 MMHG | SYSTOLIC BLOOD PRESSURE: 110 MMHG

## 2018-06-08 VITALS — SYSTOLIC BLOOD PRESSURE: 127 MMHG | DIASTOLIC BLOOD PRESSURE: 60 MMHG

## 2022-01-04 NOTE — ED GENERAL ADULT
History of Present Illness
 
General
Chief Complaint: Psychiatric Related Complaint
Stated Complaint: DETOX FROM ETOH
Source: patient, family
Exam Limitations: no limitations
 
Vital Signs & Intake/Output
Vital Signs & Intake/Output
 Vital Signs
 
 
Date Time Temp Pulse Resp B/P Pulse O2 O2 Flow FiO2
 
     Ox Delivery Rate 
 
03/13 0704 98.0 88 16 130/80 98 Room Air  
 
03/13 0010 98.0 88 16 130/80 98 Room Air  
 
03/12 2246 95.8 73 16 108/63    
 
03/12 2246 95.8 73 16 108/63 98 Room Air  
 
03/12 1948 96.5 91 16 135/86    
 
03/12 1948 96.5 91 16 135/86 98 Room Air  
 
03/12 1647 98.6 79 16 114/60    
 
03/12 1647 98.6 79 16 114/60 99 Room Air  
 
03/12 1407 96.8 90 18 132/65    
 
03/12 1356 96.8 90 18 132/65 98 Room Air  
 
 
 ED Intake and Output
 
 
 03/13 0000 03/12 1200
 
Intake Total 240 
 
Output Total  
 
Balance 240 
 
   
 
Intake, Oral 240 
 
Patient 140 lb 
 
Weight  
 
 
Allergies
Coded Allergies:
NO KNOWN ALLERGIES (05/21/14)
 
Triage Note:
per pt need to detox from etoh, last drink couple
 hrs ago, drinking x 12 yrs. no recent drug use..
 lips dry alert mentating well. admits to 1-2 pints
 daily
Triage Nurses Notes Reviewed? yes
Pregnant: No
Patient currently breastfeeds: No
HPI:
27 yo F PMH EtOH abuse (c/b withdrawal syndromes, seizures) presenting with EtOH
intoxication seeking detox. Patient has been drinking 2 pints of EtOH for the 
last year, recenlty moved back to [place] from [place], has been living with parents, 
tonight patient and parents "fed up" with EtOH abuse prompting presentation to 
ED for detox. Last drink 2 hrs ago. Mild anxiety and tremors. Interimttent 
nausea, vomiting, and epigasrtic abdominal pain, absent currently. Denies 
illicit drug use. PPH of anxiety, depression, denies SI, plan, intent, AH, VH. 
Denies falls, trauma, pain, fevers, chills, chest pain, SOB, focal neurologic 
Sx. 
(SCHOENECK MD,OTIS)
Reconcile Medications
No Known Home Medications
 
(JEFFREY PATEL,LILY SEN)
 
Past History
 
Travel History
Traveled to [place] past 21 day No
 
Medical History
Any Pertinent Medical History? see below for history
Neurological: NONE
EENT: NONE
Cardiovascular: NONE
Respiratory: NONE
Gastrointestinal: NONE
Hepatic: NONE
Renal: NONE
Musculoskeletal: NONE
Psychiatric: NONE
Endocrine: NONE
 
Surgical History
Surgical History: none
 
Psychosocial History
Who do you live with Family
What is your primary language English
Tobacco Use: Current Daily Use
Daily Tobacco Use Amount/Type: => 5 Cigarettes daily
ETOH Use: alcoholic
Illicit Drug Use: UTD
 
Family History
Hx Contributory? Yes
(SCHOENECK MD,JACOB)
 
Review of Systems
 
Review of Systems
Constitutional:
Reports: no symptoms. 
EENTM:
Reports: no symptoms. 
Respiratory:
Reports: no symptoms. 
Cardiovascular:
Reports: no symptoms. 
GI:
Reports: no symptoms, abdominal pain, nausea, vomiting. 
Genitourinary:
Reports: no symptoms. 
Musculoskeletal:
Reports: no symptoms. 
Skin:
Reports: no symptoms. 
Neurological/Psychological:
Reports: anxiety, tremors. 
Hematologic/Endocrine:
Reports: no symptoms. 
Immunologic/Allergic:
Reports: no symptoms. 
All Other Systems: Reviewed and Negative
(SCHOENECK MD,JACOB)
 
Physical Exam
 
Physical Exam
General Appearance: well developed/nourished, no apparent distress, alert, awake
, anxious
Head: atraumatic, normal appearance
Eyes:
Bilateral: normal appearance. 
Ears, Nose, Throat: normal pharynx, normal ENT inspection
Neck: normal inspection, supple, full range of motion
Respiratory: normal breath sounds, no respiratory distress, lungs clear
Cardiovascular: regular rate/rhythm, normal peripheral pulses
Gastrointestinal: normal bowel sounds, soft, non-tender
Extremities: normal inspection
Neurologic/Psych: no motor/sensory deficits, awake, alert, oriented x 3
Comments:
Abdomen: Soft and non-TTP throughout
Neurologic: No tremors or tongue fasiculations
 
Core Measures
ACS in differential dx? No
CVA/TIA Diagnosis: No
Severe Sepsis Present: No
Septic Shock Present: No
(SCHOENECK MD,JACOB)
 
Progress
Differential Diagnoses
I considered the following diagnoses in my evaluation of the patient: [EtOH 
abuse, EtOH withdrawal]
 
Plan of Care:
 Orders
 
 
Procedure Date/time Status
 
Regular Diet 03/13 B Active
 
Change service to 03/13 0959 Active
 
HEPT C ANTIBODY 03/13 0600 Complete
 
CBC WITHOUT DIFFERENTIAL 03/13 0600 Complete
 
BASIC ELECTROLYTES PLUS BUN&CR 03/13 0600 Complete
 
Lab Add-on Test 03/13  UNK Active
 
SOCIAL WORK CONSULT 03/13  UNK Active
 
PSYCHIATRIC CONSULT 03/13  UNK Active
 
Vital Signs 03/12 2346 Active
 
Teach/Educate 03/12 2346 Active
 
Pain Treatment and Response 03/12 2346 Active
 
Nutritional Intake, Monitor 03/12 2346 Active
 
Isolation 03/12 2346 Active
 
Intake & Output 03/12 2346 Active
 
Patient Care Conference 03/12 2346 Active
 
Activity/Ambulation 03/12 2346 Active
 
Pathway - chart 03/12 2207 Active
 
Pathway - chart 03/12 2140 Active
 
House Staff 03/12 2140 Active
 
Patient Data 03/12 2140 Active
 
Lab Add-on Test 03/12 2140 Active
 
BASIC ELECTROLYTES PLUS BUN&CR 03/12 2140 Complete
 
Patient Data 03/12 2054 Active
 
Admit to inpatient 03/12 1831 Active
 
Vital Signs 03/12 1831 Active
 
Code Status 03/12 1831 Active
 
Intake & Output 03/12 1207 Active
 
VTE Mechanical Prophylaxis 03/12  UNK Active
 
CIWA 03/12  UNK Complete
 
SOCIAL WORK CONSULT 03/12  UNK Active
 
PHOSPHORUS 03/11 2255 Complete
 
MAGNESIUM 03/11 2255 Complete
 
LIPASE 03/11 2255 Complete
 
HEPT B SURFACE ANTIGEN 03/11 2255 Complete
 
HEPT B SURFACE ANTIBODY 03/11 2255 Complete
 
AMYLASE 03/11 2255 Complete
 
 
 Current Medications
 
 
  Sig/Rishabh Start time  Last
 
Medication Dose  Stop Time Status Admin
 
Omeprazole 20 MG DAILY AC 03/13 0700 CAN 
 
(Prilosec)     
 
Acetaminophen 650 MG Q6P PRN 03/12 2215 AC 
 
(Tylenol)     
 
Oxycodone HCl 5 MG Q6P PRN 03/12 2215 AC 
 
(Roxicodone)     
 
Lorazepam 1 MG Q1P PRN 03/12 2145 AC 
 
(Ativan)     
 
Sodium Chloride 1,000 ML BOLUS ONE 03/12 1245 CAN 
 
(Normal Saline 0.9%)   03/12 1344  
 
Lorazepam 2 MG Q2P PRN 03/11 2315 AC 
 
(Ativan)     
 
 
 Laboratory Tests
 
 
 
03/13/17 0645:
Anion Gap 12, Estimated GFR > 60, BUN/Creatinine Ratio 18.3, CBC w Diff NO MAN 
DIFF REQ, RBC 4.59, MCV 92.0, MCH 30.9, RDW 14.6  H, MPV 6.9  L, Gran % 50.7, 
Lymphocytes % 33.5, Monocytes % 11.2  H, Eosinophils % 3.5, Basophils % 1.1, 
Absolute Granulocytes 2.3, Absolute Lymphocytes 1.5, Absolute Monocytes 0.5, 
Absolute Eosinophils 0.2, Absolute Basophils 0, PUBS MCHC 33.6, Hepatitis C 
Antibody NONREACTIVE
 
03/13/17 0030:
Anion Gap 16, Estimated GFR > 60, BUN/Creatinine Ratio 20.0
 
Physician MDM: 27 yo F PMH extensive EtOH abuse with hx of complicated 
withdrawals presenting with EtOH abuse seeking detox. , SBP 130s, no 
tremors or tongue fasiculations on exam. Plan for labs, CIWA ativan based 
withdrawal scale, monitor overnight for admission vs. discharge. D/W Dr. Alcala. 
 
(SCHOENECK MD,OTIS)
 
 
5:15 PM
STILL WAITING HUSKY APPROVAL.
7:20 AM
Patient signed out to me by Dr. Alcala.  Pending case management/crisis 
evaluation.
 
6:30 PM
APPROVED FOR INPATIENT DETOX.  D/W DR PATTON AND MOD.
(JENNA PATEL,ESA)
Initial ED EKG: none
(SCHOENECK MD,OTIS)
Hand-Off
   Endorsed To:
ESA WEST MD
   Endorsed Time: 0700
   Pending: consult (case management/sobriety), other (sobriety)
(JEFFREY PATEL,LILY SEN)
Differential Diagnoses
I considered the following diagnoses in my evaluation of the patient: 
 
(ESA WEST MD)
 
Departure
 
Departure
Disposition: STILL A PATIENT
Condition: Stable
Clinical Impression
Primary Impression: ETOH abuse
Secondary Impressions: 
EtOH dependence
Qualifiers:  Substance use status: in withdrawal Complication of substance-
induced condition: uncomplicated Qualified Code: F10.230 - Alcohol dependence 
with withdrawal, uncomplicated
 
Referrals:
PATIENT HAS NO PRIMARY CARE DR (PCP/Family)
 
Departure Forms:
Customer Survey
General Discharge Information
(SCHOENECK MD,JACOB)
 
Departure
Prescriptions:
Current Visit Scripts
No Known Home Medications    
 
(JEFFREY PATEL,LILY SEN)
 
Departure
Time of Disposition: 1832
 
Admission Note
Spoke With:
KRISTA PATTON MD
Documentation of Exam:
Documentation of any treatments & extenuating circumstances including Concerns 
Regarding Discharge (functional status, medication knowledge or non-compliance, 
living conditions, etc.) that warrant an admission rather than observation: [
ATIVAN TAPER, CIWA PROTOCOL, MONITOR I/O, CRISIS CONSULTATION, SEIZURE 
PRECAUTIONS]
 
 
Resident Co-Sign Statement
Statement:
ED Attending supervision documentation-
 
[X] I saw and evaluated the patient. I have also reviewed all the pertinent lab 
results and diagnostic results. I agree with the findings and the plan of care 
as documented in the Resident's documentation. 
 
[X] I have reviewed the ED Record and agree with the Resident's documentation.
 
[] Additions or exceptions (if any) to the Resident's note and plan are 
summarized below:
[]
 
(JENNA PATEL,ESA)
 
Critical Care Note
 
Critical Care Note
Critical Care Time: non-applicable
(SCHOENECK MD,OTIS) Phone call to patient, he expressed understanding and was transferred to schedule